# Patient Record
Sex: MALE | Race: WHITE
[De-identification: names, ages, dates, MRNs, and addresses within clinical notes are randomized per-mention and may not be internally consistent; named-entity substitution may affect disease eponyms.]

---

## 2023-01-05 ENCOUNTER — HOSPITAL ENCOUNTER (OUTPATIENT)
Dept: HOSPITAL 97 - ER | Age: 73
Setting detail: OBSERVATION
LOS: 2 days | Discharge: HOME | End: 2023-01-07
Attending: INTERNAL MEDICINE | Admitting: INTERNAL MEDICINE
Payer: COMMERCIAL

## 2023-01-05 VITALS — BODY MASS INDEX: 30.9 KG/M2

## 2023-01-05 DIAGNOSIS — Z20.822: ICD-10-CM

## 2023-01-05 DIAGNOSIS — H81.10: ICD-10-CM

## 2023-01-05 DIAGNOSIS — R07.9: Primary | ICD-10-CM

## 2023-01-05 DIAGNOSIS — Z88.0: ICD-10-CM

## 2023-01-05 DIAGNOSIS — Z23: ICD-10-CM

## 2023-01-05 DIAGNOSIS — E78.5: ICD-10-CM

## 2023-01-05 DIAGNOSIS — I10: ICD-10-CM

## 2023-01-05 DIAGNOSIS — I65.21: ICD-10-CM

## 2023-01-05 DIAGNOSIS — R42: ICD-10-CM

## 2023-01-05 LAB
ALBUMIN SERPL BCP-MCNC: 3.6 G/DL (ref 3.4–5)
ALP SERPL-CCNC: 106 U/L (ref 45–117)
ALT SERPL W P-5'-P-CCNC: 34 U/L (ref 16–61)
AST SERPL W P-5'-P-CCNC: 27 U/L (ref 15–37)
BUN BLD-MCNC: 15 MG/DL (ref 7–18)
GLUCOSE SERPLBLD-MCNC: 114 MG/DL (ref 74–106)
HCT VFR BLD CALC: 44 % (ref 39.6–49)
INR BLD: 1.1
LYMPHOCYTES # SPEC AUTO: 0.9 K/UL (ref 0.7–4.9)
MAGNESIUM SERPL-MCNC: 1.9 MG/DL (ref 1.6–2.4)
MCV RBC: 90.5 FL (ref 80–100)
NT-PROBNP SERPL-MCNC: 99 PG/ML (ref ?–125)
PMV BLD: 7.5 FL (ref 7.6–11.3)
POTASSIUM SERPL-SCNC: 3.9 MMOL/L (ref 3.5–5.1)
RBC # BLD: 4.86 M/UL (ref 4.33–5.43)
TROPONIN I SERPL HS-MCNC: 6.1 PG/ML (ref ?–58.9)
TSH SERPL DL<=0.05 MIU/L-ACNC: 0.63 UIU/ML (ref 0.36–3.74)

## 2023-01-05 PROCEDURE — 93005 ELECTROCARDIOGRAM TRACING: CPT

## 2023-01-05 PROCEDURE — 85610 PROTHROMBIN TIME: CPT

## 2023-01-05 PROCEDURE — 93306 TTE W/DOPPLER COMPLETE: CPT

## 2023-01-05 PROCEDURE — 70544 MR ANGIOGRAPHY HEAD W/O DYE: CPT

## 2023-01-05 PROCEDURE — 87088 URINE BACTERIA CULTURE: CPT

## 2023-01-05 PROCEDURE — 71045 X-RAY EXAM CHEST 1 VIEW: CPT

## 2023-01-05 PROCEDURE — 87086 URINE CULTURE/COLONY COUNT: CPT

## 2023-01-05 PROCEDURE — 78452 HT MUSCLE IMAGE SPECT MULT: CPT

## 2023-01-05 PROCEDURE — 81015 MICROSCOPIC EXAM OF URINE: CPT

## 2023-01-05 PROCEDURE — 70450 CT HEAD/BRAIN W/O DYE: CPT

## 2023-01-05 PROCEDURE — 84443 ASSAY THYROID STIM HORMONE: CPT

## 2023-01-05 PROCEDURE — 87811 SARS-COV-2 COVID19 W/OPTIC: CPT

## 2023-01-05 PROCEDURE — 85025 COMPLETE CBC W/AUTO DIFF WBC: CPT

## 2023-01-05 PROCEDURE — 93017 CV STRESS TEST TRACING ONLY: CPT

## 2023-01-05 PROCEDURE — 84484 ASSAY OF TROPONIN QUANT: CPT

## 2023-01-05 PROCEDURE — 83880 ASSAY OF NATRIURETIC PEPTIDE: CPT

## 2023-01-05 PROCEDURE — 99285 EMERGENCY DEPT VISIT HI MDM: CPT

## 2023-01-05 PROCEDURE — 93880 EXTRACRANIAL BILAT STUDY: CPT

## 2023-01-05 PROCEDURE — 80048 BASIC METABOLIC PNL TOTAL CA: CPT

## 2023-01-05 PROCEDURE — 81003 URINALYSIS AUTO W/O SCOPE: CPT

## 2023-01-05 PROCEDURE — 70549 MR ANGIOGRAPH NECK W/O&W/DYE: CPT

## 2023-01-05 PROCEDURE — 80076 HEPATIC FUNCTION PANEL: CPT

## 2023-01-05 PROCEDURE — 70553 MRI BRAIN STEM W/O & W/DYE: CPT

## 2023-01-05 PROCEDURE — 36415 COLL VENOUS BLD VENIPUNCTURE: CPT

## 2023-01-05 PROCEDURE — 84100 ASSAY OF PHOSPHORUS: CPT

## 2023-01-05 PROCEDURE — 83735 ASSAY OF MAGNESIUM: CPT

## 2023-01-05 NOTE — RAD REPORT
EXAM DESCRIPTION:  RAD - Chest Single View - 1/5/2023 3:05 pm

 

CLINICAL HISTORY:  syncope

Chest pain.

 

COMPARISON:  No comparisons

 

FINDINGS:  Portable technique limits examination quality.

 

The lungs are grossly clear. The heart is upper limit of normal in size. No displaced fractures.

 

IMPRESSION:  No acute intrathoracic process suspected.

## 2023-01-05 NOTE — RAD REPORT
EXAM DESCRIPTION:  US - CP - 1/5/2023 3:58 pm

 

CLINICAL HISTORY:  DIZZINESS

Headache, drowsiness

 

COMPARISON:  No comparisons

 

TECHNIQUE:  Real-time sonographic evaluation of both carotid systems was performed. Doppler interroga
tion was performed with waveform tracing bilaterally.

 

FINDINGS:  Normal high resistance waveforms are noted in both external carotid arteries. The common c
arotid arteries and internal carotid arteries show normal low resistance waveforms.

 

Moderate hard plaque is seen right carotid bulb. Visually this results in a 50% stenosis based NASCET
 criteria. Peak systolic and end diastolic velocity values and the ICA/CCA ratios are in the non-hemo
dynamically significant range.

 

Right vertebral artery nonvisualized. Forward flow seen left vertebral artery.

 

IMPRESSION:  Moderate hard plaque is seen right carotid bulb.

 

Visually, 50% stenosis based on NASCET criteria suspected at this level. No hemodynamically significa
nt stenosis evident.

## 2023-01-05 NOTE — RAD REPORT
EXAM DESCRIPTION:  CT - Head Brain Wo Cont - 1/5/2023 2:57 pm

 

CLINICAL HISTORY:  Syncope, recurrent

Headache, drowsiness,

 

COMPARISON:  No comparisons

 

TECHNIQUE:  All CT scans are performed using dose optimization technique as appropriate and may inclu
de automated exposure control or mA/KV adjustment according to patient size.

 

FINDINGS:  No intracranial hemorrhage, hydrocephalus or extra-axial fluid collection.Mild brain atrop
hy.No areas of brain edema or evidence of midline shift.

 

The paranasal sinuses and mastoids are clear. The calvarium is intact.

 

IMPRESSION:  No acute intracranial abnormality.

## 2023-01-05 NOTE — ER
Nurse's Notes                                                                                     

 [facility]                                                                 

Name: Jessu Muller                                                                             

Age: 72 yrs                                                                                       

Sex: Male                                                                                         

: 1950                                                                                   

MRN: [id number]                                                                                   

Arrival Date: 2023                                                                          

Time: 14:26                                                                                       

Account#: [id number]                                                                            

Bed 20                                                                                            

Private MD:                                                                                       

Diagnosis: Syncope Near                                                                           

                                                                                                  

Presentation:                                                                                     

                                                                                             

14:35 Initial Sepsis Screen: Does the patient meet any 2 criteria? No. Patient's initial      db  

      sepsis screen is negative. Does the patient have a suspected source of infection? No.       

      Patient's initial sepsis screen is negative. Risk Assessment: Do you want to hurt           

      yourself or someone else? Patient reports no desire to harm self or others. Onset of        

      symptoms was 2023.                                                              

14:35 Acuity: LG 2                                                                           db  

14:40 Chief complaint: EMS states: Patient became dizzy and diaphoretic and pale while trying db  

      to put something in the back of his truck. States has had these similar symptoms but        

      was able to calm down. states felt like was going to pass out had chest "burning" and       

      palpitations. Denies pain. Calm upon arrival and no longer diaphoretic. Coronavirus         

      screen: Vaccine status: Patient reports receiving the 2nd dose of the covid vaccine.        

      Client denies travel out of the [place] in the last 14 days. At this time, the client does     

      not indicate any symptoms associated with coronavirus-19. Ebola Screen: Patient             

      negative for fever greater than or equal to 101.5 degrees Fahrenheit, and additional        

      compatible Ebola Virus Disease symptoms Patient denies exposure to infectious person.       

      Patient denies travel to an Ebola-affected area in the 21 days before illness onset. No     

      symptoms or risks identified at this time.                                                  

14:40 Method Of Arrival: EMS: [facility]                                                db  

                                                                                                  

Triage Assessment:                                                                                

15:02 General: Appears in no apparent distress. comfortable, Behavior is calm, cooperative,   db  

      appropriate for age. Pain: Denies pain. Complains of pain in chest.                         

                                                                                                  

Historical:                                                                                       

- Allergies:                                                                                      

15:02 PENICILLINS;                                                                            db  

- PMHx:                                                                                           

15:02 Hypertensive disorder;                                                                  db  

                                                                                                  

- Immunization history:: Adult Immunizations up to date, Client reports receiving the             

  2nd dose of the Covid vaccine.                                                                  

- Social history:: Smoking status: unknown.                                                       

                                                                                                  

                                                                                                  

Screenin:00 [facility] ED Fall Risk Assessment (Adult) History of falling in the last 3 months,       db  

      including since admission No falls in past 3 months (0 pts). Abuse screen: Denies           

      threats or abuse. Denies injuries from another. Nutritional screening: No deficits          

      noted. Tuberculosis screening: No symptoms or risk factors identified.                      

                                                                                                  

Assessment:                                                                                       

15:00 Reassessment: Patient appears in no apparent distress at this time. Patient is alert,   db  

      oriented x 3, equal unlabored respirations, skin warm/dry/pink. General: Appears in no      

      apparent distress. comfortable, Behavior is calm, cooperative, appropriate for age.         

      Neuro: Reports dizziness.                                                                   

15:00 Cardiovascular: Reports diaphoresis, lightheadedness, Chest pain "BURNING" SENSATION    db  

      ACROSS CHEST.                                                                               

16:30 Reassessment: Patient appears in no apparent distress at this time. Patient and/or      db  

      family updated on plan of care and expected duration. Pain level reassessed. Patient is     

      alert, oriented x 3, equal unlabored respirations, skin warm/dry/pink. PATIENT              

      AMBULATORY TO THE RESTROOM. STEADY GATE. NAD. DENIES DIZZINESS DENIES NAUSEA Patient        

      states feeling better. Patient states symptoms have improved. General: Appears in no        

      apparent distress. comfortable, Behavior is calm, cooperative, appropriate for age.         

      Pain: Denies pain. Neuro: No deficits noted. Level of Consciousness is awake, alert,        

      obeys commands, Oriented to person, place, time, situation. Cardiovascular: No deficits     

      noted. Respiratory: No deficits noted. GI: No deficits noted. No signs and/or symptoms      

      were reported involving the gastrointestinal system. : No deficits noted. No signs        

      and/or symptoms were reported regarding the genitourinary system. EENT: No deficits         

      noted. No signs and/or symptoms were reported regarding the EENT system. Derm: No           

      deficits noted. No signs and/or symptoms reported regarding the dermatologic system.        

      Musculoskeletal: No deficits noted. No signs and/or symptoms reported regarding the         

      musculoskeletal system.                                                                     

17:38 Reassessment: Patient appears in no apparent distress at this time. No changes from     db  

      previously documented assessment. Patient and/or family updated on plan of care and         

      expected duration. Pain level reassessed. Patient is alert, oriented x 3, equal             

      unlabored respirations, skin warm/dry/pink. Patient states symptoms have improved.          

18:24 Reassessment: Patient appears in no apparent distress at this time. No changes from     db  

      previously documented assessment. Patient and/or family updated on plan of care and         

      expected duration. Pain level reassessed. Patient is alert, oriented x 3, equal             

      unlabored respirations, skin warm/dry/pink.                                                 

19:20 Reassessment: ASSUMED CARE OF PT. PT SITTING UP IN BED. NO PAIN OR DISTRESS NOTED .VS   jj7 

      STABLE. CALL BELL IN REACH. NO NEEDS AT THIS TIME.                                          

22:00 Reassessment: PT SWITCHED IN TO A HOSPITAL BED FOR COMFORT. VS STABLE. LIGHTS TURNED    jj7 

      OFF .WARM BLANKETS PROVIDED. CALL BELL IN REACH. NO NEEDS AT THIS TIME.                     

                                                                                             

00:41 Reassessment: PT SLEEPING COMFORTABLY IN BED. VS STABLE. CALL BELL IN REACH. SIDE RAILS jj7 

      UP X2.                                                                                      

                                                                                                  

Vital Signs:                                                                                      

                                                                                             

14:35  / 84; Pulse 76; Resp 16; Temp 98.6; Pulse Ox 100% ; Weight 96.16 kg; Height 5    db  

      ft. 10 in. (177.80 cm); Pain 0/10;                                                          

15:00  / 74; Pulse 76; Resp 18; Pulse Ox 100% on R/A;                                   db  

17:00  / 85; Pulse 69; Resp 18; Pulse Ox 97% on R/A;                                    db  

18:20  / 86 Supine; Pulse 70;                                                           db  

18:22  / 86; Pulse 73;                                                                  db  

18:24  / 89 Standing; Pulse 76;                                                         db  

19:20  / 83; Pulse 69; Resp 19; Pulse Ox 99% ; Pain 0/10;                               jj7 

20:30  / 92; Pulse 68; Resp 16; Pulse Ox 99% ; Pain 0/10;                               jj7 

21:32  / 74; Pulse 75; Resp 18; Pulse Ox 99% ; Pain 0/10;                               jj7 

22:29  / 75; Pulse 71; Resp 20; Pulse Ox 97% ;                                          jj7 

23:30  / 68; Pulse 68; Resp 16; Pulse Ox 100% ; Pain 0/10;                              jj7 

                                                                                             

00:43  / 74; Pulse 68; Resp 17; Pulse Ox 99% ;                                          jj7 

                                                                                             

14:35 Body Mass Index 30.42 (96.16 kg, 177.80 cm)                                             db  

                                                                                                  

Vitals:                                                                                           

                                                                                             

18:24 Cardiac Rhythm Assessment Regular Sinus rhythm.                                         db  

                                                                                                  

NIH Stroke Scale Scores:                                                                          

14:53 NIHSS Score: 0                                                                          snw 

                                                                                                  

ED Course:                                                                                        

14:26 Patient arrived in ED.                                                                  snw 

14:26 Greg Menchaca DO is Attending Physician.                                                snw 

14:26 Joann Garcia FNP-C is PHCP.                                                          snw 

14:35 Arm band placed on right wrist. Patient placed in an exam room.                         db  

14:57 Yamilet Camp, RN is Primary Nurse.                                                  db  

14:58 CT Head Brain wo Cont In Process Unspecified.                                           EDMS

15:02 Triage completed.                                                                       db  

15:06 XRAY Chest (1 view) In Process Unspecified.                                             EDMS

15:13 Inserted saline lock: 20 gauge in left antecubital area, using aseptic technique. Blood ll1 

      collected.                                                                                  

15:32 Basic Metabolic Panel Sent.                                                             bc6 

15:32 CBC with Diff Sent.                                                                     bc6 

15:32 LFT's Sent.                                                                             bc6 

15:32 Magnesium Sent.                                                                         bc6 

15:32 NT PRO-BNP Sent.                                                                        bc6 

15:32 PT-INR Sent.                                                                            bc6 

15:32 Troponin HS Sent.                                                                       bc6 

15:32 Initial lab(s) drawn, by me, sent to lab. Inserted saline lock: 20 gauge in right       bc6 

      antecubital area, using aseptic technique.                                                  

15:59 US Carotid Artery Bilateral In Process Unspecified.                                     EDMS

17:00 Patient has correct armband on for positive identification. Bed in low position. Call   db  

      light in reach. Side rails up X 1.                                                          

17:00 Client placed on continuous cardiac and pulse oximetry monitoring. NIBP monitoring      db  

      applied. Warm blanket given.                                                                

17:00 No provider procedures requiring assistance completed.                                  db  

17:19 Urine Culture Sent.                                                                     bc6 

17:19 Urine Microscopic Only Sent.                                                            bc6 

17:20 Urine collected: clean catch specimen.                                                  bc6 

17:47 Elie Crouch MD is Hospitalizing Provider.                                             snw 

19:41 SARS RAPID Sent.                                                                        as7 

01/                                                                                             

00:49 Patient admitted, IV remains in place.                                                  jj7 

                                                                                                  

Administered Medications:                                                                         

                                                                                             

16:22 CANCELLED (not available): Phenergan (promethazine) 12.5 mg IM once                     snw 

16:26 Drug: Aspirin Chewable Tablet 324 mg Route: PO;                                         db  

17:58 Drug: Potassium \T\ Sodium Phosphates Packet 280 mg-160 mg-250 mg 1 packets Route: PO;    db

18:26 Follow up: Response: No adverse reaction                                                db  

                                                                                                  

                                                                                                  

Medication:                                                                                       

17:00 VIS not applicable for this client.                                                     db  

                                                                                                  

Outcome:                                                                                          

17:47 Decision to Hospitalize by Provider.                                                    snw 

                                                                                             

00:48 Admitted to Med/surg accompanied by nurse, via wheelchair, room 413, Report called to   julius RAMOS RN                                                                                    

      Condition: stable                                                                           

01:22 Patient left the ED.                                                                    julius 

                                                                                                  

                                                                                                  

NIH Stroke Scale - NIH Stroke Score                                                               

Date: 2023                                                                                  

Time: 14:53                                                                                       

Total Score = 0                                                                                   

  1a. Level of Consciousness (LOC) - 0(Alert)                                                     

  1b. Level of Consciousness (LOC) (Month \T\ Age) - 0(Both)                                      

  1c. LOC Commands (Open \T\ Closes Eyes/) - 0(Both)                                          

   2. Best Gaze (Lateral Gaze Paresis) - 0(Normal)                                                

   3. Visual Field Loss - 0(No visual loss)                                                       

   4. Facial Palsy - 0(Normal)                                                                    

  5a. Left Arm: Motor (10-second hold) - 0(No drift)                                              

  5b. Right Arm: Motor (10-second hold) - 0(No drift)                                             

  6a. Left Leg: Motor (5-second hold - always test supine) - 0(No drift)                          

  6b. Right Leg: Motor (5-second hold - always test supine) - 0(No drift)                         

   7. Limb Ataxia (finger/nose \T\ heel/shin - test with eyes open) - 0(Absent)                   

   8. Sensory Loss (pinprick arms/legs/face) - 0(Normal)                                          

   9. Best Language: Aphasia (description/naming/reading) - 0(No aphasia)                         

  10. Dysarthria (speech clarity - read or repeat words) - 0(Normal)                              

  11. Extinction and Inattention (visual/tactile/auditory/spatial/personal) - 0(No                

      abnormality)                                                                                

Initials: snw                                                                                     

                                                                                                  

Signatures:                                                                                       

Dispatcher [employer]                           EDMS                                                 

Joann Garcia, FNP-C                   FNP-Csnw                                                  

Sandip Anderson RN                       RN   llAmrik Rosenthal RN RN jj7 Benton, Danielle, RN RN db Senkyrik, Autumn as7 Carowatson, Breana bc6                                                  

                                                                                                  

**************************************************************************************************

## 2023-01-05 NOTE — XMS REPORT
Continuity of Care Document

                           Created on:2023



Patient:GAYLE ARTEAGA

Sex:Male

:1950

External Reference #:[id number]





Demographics







                          Address                   [address] [place] [address]

 

                          Home Phone                [phone]

 

                          Mobile Phone              [phone]

 

                          Email Address             [email or web address]

 

                          Preferred Language        en

 

                          Marital Status            Unknown

 

                          Mu-ism Affiliation     Unknown

 

                          Race                      Unknown

 

                          Additional Race(s)        White



                                                    Unavailable

 

                          Ethnic Group              Not  or 









Author







                          Organization              [facility]

t

 

                          Address                   [address] [place] [address] [address]



                                                    [place] [address]

 

                          Phone                     [phone]









Support







                Name            Relationship    Address         Phone

 

                JOSUÉ ARTEAGA               Unavailable     Unavailable

 

                CELINA TORRES  Daughter        Unavailable     [phone]









Care Team Providers







                    Name                Role                Phone

 

                    Miko Elie AGUILAR      Primary Care Physician [phone]

 

                    LEXX HADLEY Attending Clinician Unavailable

 

                    TRISH HOBBS     Attending Clinician Unavailable

 

                    ANGELICA WALLACE Attending Clinician Unavailable

 

                    MERA ANTONY       Attending Clinician Unavailable

 

                    Stefan Watters MD  Attending Clinician [phone]

 

                    Diego Dillard MD     Attending Clinician [phone]

 

                    Sanju Guerra Attending Clinician [phone]

 

                    Diego Dillard Attending Clinician [phone]

 

                    MEHREEN HAMPTON   Attending Clinician Unavailable

 

                    CELINA SUGGS        Attending Clinician Unavailable

 

                    Shiv_HANNAH          Attending Clinician Unavailable

 

                    Willam Chaney Attending Clinician [phone]

 

                    Fe Katz Attending Clinician [phone]

 

                    Provider, [facility] Attending Clinician Unavailable

 

                    FE FUNES    Attending Clinician Unavailable

 

                    Doctor Unassigned, No Name Attending Clinician Unavailable

 

                    DIEGO DILLARD M.D.  Attending Clinician Unavailable

 

                    NAJMA SHANNON M.D. Attending Clinician Unavailable

 

                    KERI NAM M.D. Attending Clinician Unavailable

 

                    Shiv_HANNAH          Admitting Clinician Unavailable









Payers







           Payer Name Policy Type Policy Number Effective Date Expiration Date LITO garcia

 

           [facility] MEDICARE            [id number] 2020            



           SUPPLEMENT                       00:00:00              

 

           MEDICARE PART B            [id number] 2019 



                                            00:00:00   00:00:00   

 

           MEDICARE PART A            [id number] 2015            



           \T\ B                            00:00:00              

 

           [facility]            [id number] 2022            



           MEDICARE                         00:00:00              



           SUPPLEMENT                                             

 

           [employer]                [id number] 2021            



           HEALTHCARE/[employer]ARP                       00:00:00              

 

           [place]:            [id number] 2015            



           [employer]                       00:00:00              

 

           [employer]            [id number] 2021            



           [employer] (MEDICARE                       00:00:00              



           SUPPLEMENT)                                             







Problems







       Condition Condition Condition Status Onset  Resolution Last   Treating Co

mments 

Source



       Name   Details Category        Date   Date   Treatment Clinician        



                                                 Date                 

 

       Rhegmatoge Rhegmatoge Disease Active 2022                      Overview

: [facility]



       nous   nous                 2-07                        [facility]



       retinal retinal               00:00:                      g of this 



       detachment detachment               00                          note   



       of right of right                                           might be 



       eye    eye                                              different 



                                                               from the 



                                                               original. 



                                                               Hx of  



                                                               mac-off 



                                                               RRD    



                                                               repaired 



                                                               2022 by 



                                                               Dr. Wallace 



                                                               Last   



                                                               Assessmen 



                                                               t & Plan: 



                                                               [facility] 



                                                               g of this 



                                                               note   



                                                               might be 



                                                               different 



                                                               from the 



                                                               original. 



                                                               Stable 



                                                               today on 



                                                               optos/DFE 



                                                               Upcoming 



                                                               appt with 



                                                               Dr. Wallace 

 

       Adrenal Adrenal Disease Active                              [facility]



       adenoma, adenoma,               8-30                               Health



       right  right                00:00:                             



                                   00                                 

 

       Grief  Grief  Disease Active                              [facility]



       reaction reaction               5-05                               Health



                                   00:00:                             



                                   00                                 

 

       Wheezing Wheezing Disease Active                              [facility]



                                   3-18                               Health



                                   00:00:                             



                                   00                                 

 

       Cough  Cough  Disease Active                              [facility]



                                   3-18                               [facility]



                                   00:00:                             



                                   00                                 

 

       Anesthesia Anesthesia Disease Active                              [facility]

T



       of skin of skin               3-18                               Health



                                   00:00:                             



                                   00                                 

 

       Abnormal Abnormal Disease Active                              [facility]



       chest  chest                3-18                               Health



       x-ray  x-ray                00:00:                             



                                   00                                 

 

       Ptosis of Ptosis of Disease Active 2021                      Last   [facility]



       both   both                 2-06                        [facility]



       eyelids eyelids               00:00:                      t & Plan: 



                                   00                          Formattin 



                                                               g of this 



                                                               note   



                                                               might be 



                                                               different 



                                                               from the 



                                                               original. 



                                                               Refer to 



                                                               leonie 



                                                               ptosis / 



                                                               bleph  

 

       Floppy Floppy Disease Active 2021                      Last   [facility]



       eyelid eyelid               2-06                        [facility]



       syndrome syndrome               00:00:                      t & Plan: 



                                   00                          Formattin 



                                                               g of this 



                                                               note   



                                                               might be 



                                                               different 



                                                               from the 



                                                               original. 



                                                               Has had 



                                                               sleep  



                                                               apnea  



                                                               diagnosed 



                                                               but is 



                                                               not being 



                                                               treated, 



                                                               d/w pt 



                                                               importanc 



                                                               e of   



                                                               treating 



                                                               sleep  



                                                               apnea for 



                                                               cardiovas 



                                                               cular  



                                                               health he 



                                                               will d/w 



                                                               his    



                                                               PCPContin 



                                                               ue tears, 



                                                               add    



                                                               overnight 



                                                               ointment; 



                                                               if that 



                                                               isn't  



                                                               enough 



                                                               can trial 



                                                               eye    



                                                               decker/m 



                                                               oisture 



                                                               chamber 

 

       Pseudophak Pseudophak Disease Active 2021                      Last   [facility]

T



       ia, both ia, both               2-06                        [facility]

lth



       eyes   eyes                 00:00:                      t & Plan: 



                                   00                          Formattin 



                                                               g of this 



                                                               note   



                                                               might be 



                                                               different 



                                                               from the 



                                                               original. 



                                                               MRx given 

 

       Vitreous Vitreous Disease Active 2021                      Last   [facility]



       syneresis syneresis               2                        [facility]

ealth



       of both of both               00:00:                      t & Plan: 



       eyes   eyes                 00                          Formattin 



                                                               g of this 



                                                               note   



                                                               might be 



                                                               different 



                                                               from the 



                                                               original. 



                                                               D/w pt 



                                                               etiology 



                                                               of     



                                                               floaters 

 

       Nightmare Nightmare Disease Active                              [facility]



       disorder disorder                                              Health



                                   00:00:                             



                                   00                                 

 

       Low back Low back Disease Active                              [facility]



       pain   pain                 612                               Health



                                   00:00:                             



                                   00                                 

 

       Left foot Left foot Disease Active                              [facility]



       drop   drop                 612                               Health



                                   00:00:                             



                                   00                                 

 

       Spinal Spinal Disease Active                              [facility]



       stenosis stenosis               612                               Health



       of lumbar of lumbar               00:00:                             



       region region               00                                 



       without without                                                  



       neurogenic neurogenic                                                  



       claudicati claudicati                                                  



       on     on                                                      

 

       Scoliosis Scoliosis Disease Active                              [facility]



                                   6-12                               Health



                                   00:00:                             



                                   00                                 

 

       Lumbar Lumbar Disease Active 2017                             [facility]



       radiculopa radiculopa               206                               He

alth



       thy    thy                  00:00:                             



                                   00                                 

 

       Numbness Numbness Disease Active 2017                             [facility]



       and    and                  2-06                               Health



       tingling tingling               00:00:                             



                                   00                                 

 

       Spinal  Spinal Problem Active 2022               Abdelrahman

manuela



       stenosis stenosis                         03:30:39               l



       in     in                   00:00:                             Shmuel



       cervical cervical               00                                 



       region region                                                  



       (disorder) (disorder)                                                  



              Active                                                  



              2016                                                  



              Problem                                                  



              2022                                                  



               [facility]                                                  



              [facility]                                                  



              OPID                                                    



              [facility]                                                  

 

       Apnea  Apnea  Disease Active                              [facility]



                                                                  Health



                                   00:00:                             



                                   00                                 

 

       No known No known Disease                                           Unive

rs



       active active                                                  ity of



       problems problems                                                  [facility]

 

       History of History of Problem Resolve                                    

[facility]



       hyperlipid hyperlipid        d                                         Ph

ysici



       emia   emia                                                    ans

 

       History of History of Problem Resolve                                    

[facility]



       hypertensi hypertensi        d                                         Ph

ysici



       on     on                                                      ans

 

       Leg    Leg    Problem Active                                    [facility]



       numbness numbness                                                  Physic

i



                                                                      ans

 

       Degenerati Degenerati Problem Active                                    U

T



       ve     ve                                                      Physici



       scoliosis scoliosis                                                  ans

 

       Dream  Dream  Problem Active                                    [facility]



       enactment enactment                                                  Phys

ici



       behavior behavior                                                  ans

 

       Severe Severe Problem Active                                    [facility]



       obstructiv obstructiv                                                  Ph

ysici



       e sleep e sleep                                                  ans



       apnea  apnea                                                   

 

       Anxiety  Anxiety Problem Resolve               2022               M

emoria



       (finding) (finding)        d                    03:30:39               l



              Resolved                                                  Bertrand



              Problem                                                  



              2022                                                  



              [facility]                                                  

 

       Hypertensi  Hypertens Problem Active               2022            

   [facility]



       ve     marjorie                                03:30:39               l



       disorder, disorder,                                                  Herm

kvng



       systemic systemic                                                  



       arterial arterial                                                  



       (disorder) (disorder)                                                  



              Active                                                  



              Problem                                                  



              [facility]                                                  



              [facility]                                                    



              [facility]                                                  

 

       Hyperlipid  Hyperlipi Problem Active               2022            

   [facility]



       emia   demia                              03:30:39               l



       (disorder) (disorder)                                                  He

rmann



               Active                                                  



              Problem                                                  



              2022                                                  



              [facility]                                                    



              [facility]                                                  

 

       Gastroesop  Gastroeso Problem Active               2022            

   [facility]



       hageal phageal                             03:30:39               l



       reflux reflux                                                  Shmuel



       disease disease                                                  



       (disorder) (disorder)                                                  



              Active                                                  



              Problem                                                  



              2022                                                  



              [facility]                                                  



              [facility]                                                    



              [facility]                                                  

 

       Carpal  Carpal Problem Active               2022               Abdelrahman

manuela



       tunnel tunnel                             03:30:39               l



       syndrome syndrome                                                  [place]

n



       (disorder) (disorder)                                                  



              Active                                                  



              Problem                                                  



              2022                                                  



              [facility]                                                  



              [facility]                                                    



              Blessing                                                  

 

       Smoker  Smoker Problem Active               2022               Abdelrahman

manuela



       (finding) (finding)                             03:30:39               l



              Active                                                  Shmuel



              Problem                                                  



              2022                                                  



              [facility]                                                  

 

       Numbness   Numbness Problem Active               2022              

 Memoria



       of hand of hand                             03:30:39               l



       (finding) (finding)                                                  Herm

kvng



              Active                                                  



              Problem                                                  



              2022                                                  



              Bilateral                                                  



              [facility]                                                  



              MERLY                                                    



              Blessing                                                  

 

       Finding   Finding Problem Active               2022               M

emoria



       relating relating                             03:30:39               l



       to moist to moist                                                  [place]

n



       tobacco tobacco                                                  



       use    use                                                     



       (finding) (finding)                                                  



              Active                                                  



              Problem                                                  



              20[facility]                                                  



               [facility]                                                    



              [facility]                                                  

 

       Spinal  Spinal Problem Active               2022               Abdelrahman

manuela



       stenosis stenosis                             03:30:39               l



       of lumbar of lumbar                                                  Herm

kvng



       region region                                                  



       (disorder) (disorder)                                                  



              Active                                                  



              Problem                                                  



              2022                                                  



               [facility]                                                  



              Neuro,                                                  



              OPID                                                    



              Blessing                                                  

 

       Foot-drop  Foot-drop Problem Active               2022             

  Memoria



       (finding) (finding)                             03:30:39               l



              Active                                                  Bertrand



              Problem                                                  



              2022                                                  



              [facility]                                                  



              Neuro                                                   

 

       Retinal  Retinal Problem Active               2022               Me

moria



       detachment detachment                             03:30:39               

l



       (disorder) (disorder)                                                  He

rmann



              Active                                                  



              Problem                                                  



              2022                                                  



              [facility]                                                  



              Neuro                                                   

 

       R91.1 -  R91.1 - Diagnosis Active               2022-05-10               

Memoria



       SOLITARY SOLITARY                             14:36:00               l



       PULMONARY PULMONARY                                                  Herm

kvng



       NODULE NODULE                                                  



       R05.9  R05.9                                                   



              Active                                                    



              OPID                                                    



              Blessing                                                  







Allergies, Adverse Reactions, Alerts







       Allergy Allergy Status Severity Reaction(s) Onset  Inactive Treating Comm

ents 

Source



       Name   Type                        Date   Date   Clinician        

 

       Penicill Propensi Active        Unknown 2021                      [facility]



       ins    ty to                       2-06                        Health



              adverse                      00:00:                      



              reaction                      00                          



              s                                                       

 

       Penicill Propensi Active        Rash                         [facility]

s



       ins    ty to                       6-16                        ity of



              adverse                      00:00:                      [place]



              reaction                      00                          Medical



              s                                                       Branch

 

       PENICILL Drug   Active        Rash                         [facility]



       INS    Class                       6-16                        ity of



                                          00:00:                      [place]



                                          00                          Medical



                                                                      Branch

 

       [facility]ill Allergy Active        Other                              [facility]



       ins    to drug                                                  Physici



              (finding                                                  ans



              )                                                       

 

       PENICILL Allergy Active                                           [place]



       INS    to                                                      [place]



              substanc                                                  [facility]



              e                                                       

 

       penicill penicill Active Mild                                      Memori

a



       ins    ins                                                     l



                                                                      Shmuel

 

       NO KNOWN Drug   Active                                           [facility]



       ALLERGIE Class                                                   ity of



       S                                                              [place]



                                                                      Medical



                                                                      Branch







Family History







           Family Member Diagnosis  Comments   Start Date Stop Date  Source

 

           Mother     Family history of                                 

  [facility]

 

           Mother     Family history of                                  [facility]

[facility]



                      cerebrovascular accident                                  



                      (CVA)                                       

 

           Father     Family history of malignant                               

   [facility]



                      neoplasm                                    

 

           Father     Family history of                                 

  [facility]







Social History







           Social Habit Start Date Stop Date  Quantity   Comments   Source

 

           History of tobacco                       Snuff User            [facility]

[facility]



           use                                                    

 

           Exposure to 2022 Not sure              [facility]



           SARS-CoV-2 (event) 00:00:00   09:12:00                         

 

           Alcohol intake 2022 2.86 /d               [facility]



                      00:00:00   00:00:00                         

 

           Tobacco use and 2022 [facility] smokeless            [facility]



           exposure   00:00:00   00:00:00   tobacco               

 

           Cigarette  2022                       [facility]



           pack-years 00:00:00   00:00:00                         

 

           Social History 2015                       [facility] LEEANN dias



                      20:41:29   20:41:29                         

 

           Sex Assigned At 1950 M                     [facility]



           Birth      00:00:00   00:00:00                         









                Smoking Status  Start Date      Stop Date       Source

 

                Tobacco smoking consumption                                 [facility]

ealth



                unknown                                         

 

                Ex-smoker (finding)                                 [facility]

s

 

                Never Smoker                                    [facility]

ology

 

                Occasional tobacco smoker 2022 00:00:00                 [facility]

 

                Smokes tobacco daily 2022 00:00:00                 [facility]

[facility]







Medications







       Ordered Filled Start  Stop   Current Ordering Indication Dosage Frequency

 Signature

                    Comments            Components          Source



     Medication Medication Date Date Medication? Clinician                (SIG) 

          



     Name Name                                                   

 

     tamsulosin      2022      Yes            .4mg QD   Take 0.4           [facility]



     (Flomax)      2-22                               mg by           Health



     0.4 MG 24      09:22:                               mouth 1           



     hr capsule      19                                 (one) time           



                                                  each day.           

 

     citalopram      2022      Yes                 QD   Take by           [facility]



     (CeleXA) 20      2-22                               mouth 1           Healt

h



     MG tablet      09:22:                               (one) time           



               19                                 each day.           

 

     methocarbam      2022      Yes            500mg Q.25D Take 500           

[facility]



     ol        2-22                               mg by           Health



     (Robaxin)      09:22:                               mouth 4           



     500 MG      19                                 (four)           



     tablet                                         times a           



                                                  day.           

 

     tamsulosin      2022      Yes            .4mg QD   Take 0.4           [facility]



     (Flomax)      2-07                               mg by           Health



     0.4 MG 24      10:11:                               mouth 1           



     hr capsule      45                                 (one) time           



                                                  each day.           

 

     citalopram      2022      Yes                 QD   Take by           [facility]



     (CeleXA) 20      2-07                               mouth 1           Healt

h



     MG tablet      10:11:                               (one) time           



               45                                 each day.           

 

     methocarbam      2022      Yes            500mg Q.25D Take 500           

[facility]



     ol        2-07                               mg by           Health



     (Robaxin)      10:11:                               mouth 4           



     500 MG      45                                 (four)           



     tablet                                         times a           



                                                  day.           

 

     tamsulosin            Yes            .4mg QD   Take 0.4           [facility]



     (Flomax)      8-30                               mg by           Health



     0.4 MG 24      08:50:                               mouth 1           



     hr capsule      44                                 (one) time           



                                                  each day.           

 

     citalopram      -0      Yes                 QD   Take by           [facility]



     (CeleXA) 20      8-30                               mouth 1           Healt

h



     MG tablet      08:50:                               (one) time           



               44                                 each day.           

 

     methocarbam      2022-0      Yes            500mg Q.25D Take 500           

[facility]



     ol        8-30                               mg by           Health



     (Robaxin)      08:50:                               mouth 4           



     500 MG      44                                 (four)           



     tablet                                         times a           



                                                  day.           

 

     omeprazole      2022-0 2022- No             10mg QD   Take 10 mg           

[facility]



     (PriLOSEC)      5-05 05-05                          by mouth 1           He

alth



     10 MG DR      17:17: 00:00                          (one) time           



     capsule      24   :00                           each day.           



                                                  Do not           



                                                  crush or           



                                                  chew.           

 

     lovastatin      2022-0 2022- No             10mg      Take 10 mg           

[facility]



     (Mevacor)      5-05 05-05                          by mouth           Healt

h



     10 MG      17:17: 00:00                          every           



     tablet      21   :00                           night.           

 

     lisinopril      2022-0 2022- No             10mg QD   Take 10 mg           

[facility]



     10 MG      5-05 05-05                          by mouth 1           Health



     tablet      17:17: 00:00                          (one) time           



               18   :00                           each day.           

 

     gabapentin      2022-0 2022- No                  [id number] Take by        

   [facility]



     (Neurontin)      5-05 05-05                     [id number] mouth 3         

  Health



     250 MG/5ML      17:17: 00:00                     3D   (three)           



     solution      18   :00                           times a           



                                                  day.           

 

     tamsulosin      2022-0      Yes            .4mg QD   Take 0.4           [facility]



     (Flomax)      5-05                               mg by           Health



     0.4 MG 24      13:49:                               mouth 1           



     hr capsule      43                                 (one) time           



                                                  each day.           

 

     citalopram      2022-0      Yes            10mg QD   Take 10 mg           U

T



     (CeleXA) 10      5-05                               by mouth 1           He

alth



     MG/5ML      13:49:                               (one) time           



     solution      43                                 each day.           

 

     methocarbam      2022-0      Yes            500mg Q.25D Take 500           

[facility]



     ol        5-05                               mg by           Health



     (Robaxin)      13:49:                               mouth 4           



     500 MG      43                                 (four)           



     tablet                                         times a           



                                                  day.           

 

     tamsulosin      2022-0      Yes            .4mg QD   Take 0.4           [facility]



     (Flomax)      5-05                               mg by           Health



     0.4 MG 24      13:49:                               mouth 1           



     hr capsule      43                                 (one) time           



                                                  each day.           

 

     citalopram      2022-0      Yes            10mg QD   Take 10 mg           U

T



     (CeleXA) 10      5-05                               by mouth 1           He

alth



     MG/5ML      13:49:                               (one) time           



     solution      43                                 each day.           

 

     methocarbam      2022-0      Yes            500mg Q.25D Take 500           

[facility]



     ol        5-05                               mg by           Health



     (Robaxin)      13:49:                               mouth 4           



     500 MG      43                                 (four)           



     tablet                                         times a           



                                                  day.           

 

     tamsulosin      2022-0      Yes            .4mg QD   Take 0.4           [facility]



     (Flomax)      5-05                               mg by           Health



     0.4 MG 24      13:49:                               mouth 1           



     hr capsule      43                                 (one) time           



                                                  each day.           

 

     citalopram      2022-0      Yes            10mg QD   Take 10 mg           U

T



     (CeleXA) 10      5-05                               by mouth 1           He

alth



     MG/5ML      13:49:                               (one) time           



     solution      43                                 each day.           

 

     methocarbam      2022-0      Yes            500mg Q.25D Take 500           

[facility]



     ol        5-05                               mg by           Health



     (Robaxin)      13:49:                               mouth 4           



     500 MG      43                                 (four)           



     tablet                                         times a           



                                                  day.           

 

     tamsulosin      2022-0      Yes            .4mg QD   Take 0.4           [facility]



     (Flomax)      5-05                               mg by           Health



     0.4 MG 24      13:49:                               mouth 1           



     hr capsule      43                                 (one) time           



                                                  each day.           

 

     citalopram      2022-0      Yes            10mg QD   Take 10 mg           U

T



     (CeleXA) 10      5-05                               by mouth 1           He

alth



     MG/5ML      13:49:                               (one) time           



     solution      43                                 each day.           

 

     methocarbam      2022-0      Yes            500mg Q.25D Take 500           

[facility]



     ol        5-05                               mg by           Health



     (Robaxin)      13:49:                               mouth 4           



     500 MG      43                                 (four)           



     tablet                                         times a           



                                                  day.           

 

     omeprazole      2022-0      Yes                                     [facility]



     (PriLOSEC)      2-13                                              Health



     20 MG DR      00:00:                                              



     capsule      00                                                

 

     lovastatin      2022-0      Yes                                     [facility]



     (Mevacor)      2-13                                              Health



     20 MG      00:00:                                              



     tablet      00                                                

 

     lisinopril      2022-0      Yes                                     [facility]



     20 MG      2-13                                              Health



     tablet      00:00:                                              



               00                                                

 

     omeprazole      2022-0      Yes                                     [facility]



     (PriLOSEC)      2-13                                              Health



     20 MG DR      00:00:                                              



     capsule      00                                                

 

     lovastatin      2022-0      Yes                                     [facility]



     (Mevacor)      2-13                                              Health



     20 MG      00:00:                                              



     tablet      00                                                

 

     lisinopril      2022-0      Yes                                     [facility]



     20 MG      2-13                                              Health



     tablet      00:00:                                              



               00                                                

 

     omeprazole      2022-0      Yes                                     [facility]



     (PriLOSEC)      2-13                                              Health



     20 MG DR      00:00:                                              



     capsule      00                                                

 

     lovastatin      -0      Yes                                     [facility]



     (Mevacor)      2-13                                              Health



     20 MG      00:00:                                              



     tablet      00                                                

 

     lisinopril      -0      Yes                                     [facility]



     20 MG      2-13                                              Health



     tablet      00:00:                                              



               00                                                

 

     omeprazole      2-0      Yes                                     [facility]



     (PriLOSEC)      2-13                                              Health



     20 MG DR      00:00:                                              



     capsule      00                                                

 

     lovastatin      -0      Yes                                     [facility]



     (Mevacor)      2-13                                              Health



     20 MG      00:00:                                              



     tablet      00                                                

 

     lisinopril      -0      Yes                                     [facility]



     20 MG      2-13                                              Health



     tablet      00:00:                                              



               00                                                

 

     omeprazole      2-0      Yes                                     [facility]



     (PriLOSEC)      2-13                                              Health



     20 MG DR      00:00:                                              



     capsule      00                                                

 

     lovastatin      -0      Yes                                     [facility]



     (Mevacor)      2-13                                              [facility]



     20 MG      00:00:                                              



     tablet      00                                                

 

     lisinopril      2-0      Yes                                     [facility]



     20 MG      2-13                                              [facility]



     tablet      00:00:                                              



               00                                                

 

     omeprazole      2-0      Yes                                     [facility]



     (PriLOSEC)      2-13                                              Health



     20 MG DR      00:00:                                              



     capsule      00                                                

 

     lovastatin      -0      Yes                                     [facility]



     ([facility])      2-13                                              [facility]



     20 MG      00:00:                                              



     tablet      00                                                

 

     lisinopril      -0      Yes                                     [facility]



     20 MG      2-13                                              Health



     tablet      00:00:                                              



               00                                                

 

     omeprazole      2-0      Yes                                     [facility]



     (PriLOSEC)      2-13                                              Health



     20 MG DR      00:00:                                              



     capsule      00                                                

 

     lovastatin      2-0      Yes                                     [facility]



     ([facility]or)      2-13                                              [facility]



     20 MG      00:00:                                              



     tablet      00                                                

 

     lisinopril      2-0      Yes                                     [facility]



     20 MG      2-13                                              Health



     tablet      00:00:                                              



               00                                                

 

     citalopram      2021      Yes            10mg QD   Take 10 mg           U

T



     (CeleXA) 10      2-06                               by mouth 1           [facility]

alth



     MG/5ML      09:17:                               (one) time           



     solution      45                                 each day.           

 

     methocarbam      2021      Yes            500mg Q.25D Take 500           

[facility]



     ol        2-06                               mg by           Health



     (Robaxin)      09:17:                               mouth 4           



     500 MG      45                                 (four)           



     tablet                                         times a           



                                                  day.           

 

     omeprazole      2021      Yes            10mg QD   Take 10 mg           [facility]

T



     (PriLOSEC)      2-06                               by mouth 1           [facility]

[facility]



     10 MG DR      09:17:                               (one) time           



     capsule      45                                 each day.           



                                                  Do not           



                                                  crush or           



                                                  chew.           

 

     gabapentin      2021      Yes                 [id number] Take by         

  [facility]



     (Neurontin)      2-                          [id number] mouth 3          

 [facility]



     250 MG/5ML      09:13:                          3D   (three)           



     solution      08                                 times a           



                                                  day.           

 

     lovastatin      2021      Yes            10mg      Take 10 mg           U

T



     (Mevacor)      2-06                               by mouth           Health



     10 MG      09:13:                               every           



     tablet      08                                 night.           

 

     tamsulosin      2021      Yes                 QD   Take by           [facility]



     (Flomax)      2-06                               mouth 1           Health



     0.4 MG 24      09:13:                               (one) time           



     hr capsule      08                                 each day.           

 

     lisinopril      2021      Yes            10mg QD   Take 10 mg           U

T



     10 MG      2-06                               by mouth 1           Health



     tablet      09:13:                               (one) time           



               07                                 each day.           

 

     gabapentin      2021      Yes                                     [facility]



     (Neurontin)                                                    Health



     300 MG      00:00:                                              



     capsule      00                                                

 

     gabapentin      2021      Yes                                     [facility]



     (Neurontin)                                                    Health



     300 MG      00:00:                                              



     capsule      00                                                

 

     gabapentin      2021      Yes                                     [facility]



     (Neurontin)                                                    Health



     300 MG      00:00:                                              



     capsule      00                                                

 

     gabapentin      2021      Yes                                     [facility]



     (Neurontin)                                                    Health



     300 MG      00:00:                                              



     capsule      00                                                

 

     gabapentin      2021      Yes                                     [facility]



     (Neurontin)                                                    Health



     300 MG      00:00:                                              



     capsule      00                                                

 

     gabapentin      2021      Yes                                     [facility]



     (Neurontin)                                                    [facility]



     300 MG      00:00:                                              



     capsule      00                                                

 

     gabapentin      2021      Yes                                     [facility]



     (Neurontin)                                                    Health



     300 MG      00:00:                                              



     capsule      00                                                

 

     gabapentin      2021      Yes                      See            [facility]



     300 mg oral      0-05                               Instructio           l



     capsule      13:27:                               ns, TAKE 1           [facility]

[facility]



               00                                 CAPSULE BY           



                                                  MOUTH           



                                                  TWICE           



                                                  DAILY, #           



                                                  [id number]            



                                                  unknown           



                                                  unit, 3           



                                                  Refill(s),           



                                                  Pharmacy:           



                                                  [facility]           



                                                  [facility]           



                                                  SERVICE,           



                                                  [id number],           



                                                  cm,            



                                                  21           



                                                  9:31:00           



                                                  CDT,           



                                                  Height,           



                                                  96.364,           



                                                  kg,            



                                                  21           



                                                  9:31:00           



                                                  CDT,           



                                                  Weight           

 

     albuterol            Yes       [id number] 2{puff}      Inhale 2        

   [facility]



     90        6-16                               Puffs           [facility]



     mcg/actuati      00:00:                               every 6           [facility]

[facility]



     on inhaler      00                                 (six)           [facility]



                                                  hours as           [facility]



                                                  needed for           



                                                  Wheezing.           

 

     benzonatate            Yes       [id number] 100mg      Take 1          

 [facility]



     (TESSALON      6-16                               capsule by           [facility] 

of



     PERLES) 100      00:00:                               mouth 3           [facility]

[facility]



     mg capsule      00                                 (three)           [facility]

l



                                                  times           [facility]



                                                  daily.           

 

     albuterol            Yes       [id number] 2{puff}      Inhale 2        

   [facility]



     90        6-16                               Puffs           ity of



     mcg/actuati      00:00:                               every 6           [facility]

[facility]



     on inhaler      00                                 (six)           [facility]



                                                  hours as           [facility]



                                                  needed for           



                                                  Wheezing.           

 

     benzonatate            Yes       [id number] 100mg      Take 1          

 [facility]



     (TESSALON      6-16                               capsule by           [facility] 

of



     PERLES) 100      00:00:                               mouth 3           [facility]

[facility]



     mg capsule      00                                 (three)           [facility]

l



                                                  times           [facility]



                                                  daily.           

 

     albuterol            Yes       [id number] 2{puff}      Inhale 2        

   [facility]



     90        6-16                               Puffs           ity of



     mcg/actuati      00:00:                               every 6           [facility]

[facility]



     on inhaler      00                                 (six)           [facility]



                                                  hours as           [facility]



                                                  needed for           



                                                  Wheezing.           

 

     benzonatate            Yes       [id number] 100mg      Take 1          

 [facility]



     (TESSALON      6-16                               capsule by           [facility] 

of



     PERLES) 100      00:00:                               mouth 3           [facility]

[facility]



     mg capsule      00                                 (three)           [facility]

l



                                                  times           [facility]



                                                  daily.           

 

     albuterol            Yes       [id number] 2{puff}      Inhale 2        

   [facility]



     90        6-16                               Puffs           ity of



     mcg/actuati      00:00:                               every 6           [facility]

as



     on inhaler      00                                 (six)           [facility]



                                                  hours as           [facility]



                                                  needed for           



                                                  Wheezing.           

 

     benzonatate            Yes       [id number] 100mg      Take 1          

 [facility]



     (TESSALON      6-16                               capsule by           ity 

of



     PERLES) 100      00:00:                               mouth 3           [facility]

[facility]



     mg capsule      00                                 (three)           [facility]

l



                                                  times           [facility]



                                                  daily.           

 

     benzonatate      2022- No             100mg      Take 100           

[facility]



     (Tessalon)      6-16 05-05                          mg by           Health



     100 MG      00:00: 00:00                          mouth.           



     capsule      00   :00                                          

 

     albuterol      2022- No                                      [facility]



     108 (90      6-16 05-05                                         [facility]



     Base)      00:00: 00:00                                         



     MCG/ACT      00   :00                                          



     inhaler                                                        

 

     doxycycline      -0 - No        [id number] 100mg      Take 1         

  [facility]



     hyclate 100      6-16 06-24                          tablet by           it

y of



     mg tablet      00:00: 04:59                          mouth 2           [place]

s



               00   :00                           (two)           [facility]



                                                  times           [place]



                                                  daily for           



                                                  7 days.           

 

     doxycycline      0 1- No        [id number] 100mg      Take 1         

  [facility]



     hyclate 100      6-16 06-24                          tablet by           it

y of



     mg tablet      00:00: 04:59                          mouth 2           [place]

[facility]



               00   :00                           (two)           [facility]



                                                  times           [place]



                                                  daily for           



                                                  7 days.           

 

     citalopram      0      Yes                                     [facility]



     20 mg      5-26                                              ity of



     tablet      00:00:                                              [place]



                                                               [facility]

 

     lisinopriL      -0      Yes                                     [facility]



     20 mg      5-26                                              ity of



     tablet      00:00:                                              [address]

 

     lovastatin      -0      Yes                                     [facility]



     20 mg      5-26                                              ity of



     tablet      00:00:                                              [address]

 

     omeprazole      -0      Yes                                     [facility]



     20 mg      5-26                                              ity of



     capsule      00:00:                                              [address]

 

     tamsulosin      0      Yes                                     [facility]



     0.4 mg 24      5-26                                              ity of



     hr capsule      00:00:                                              [address]

 

     citalopram      -0      Yes                                     [facility]



     20 mg      5-26                                              ity of



     tablet      00:00:                                              [address]

 

     lisinopriL      -0      Yes                                     [facility]



     20 mg      5-26                                              ity of



     tablet      00:00:                                              [address]

 

     lovastatin      -0      Yes                                     [facility]



     20 mg      5-26                                              ity of



     tablet      00:00:                                              [address]

 

     omeprazole      -0      Yes                                     [facility]



     20 mg      5-26                                              ity of



     capsule      00:00:                                              [address]

 

     tamsulosin      -0      Yes                                     [facility]



     0.4 mg 24      5-26                                              ity of



     hr capsule      00:00:                                              [address]

 

     citalopram      -0      Yes                                     [facility]



     20 mg      5-26                                              ity of



     tablet      00:00:                                              [address]

 

     lisinopriL      -0      Yes                                     [facility]



     20 mg      5-26                                              ity of



     tablet      00:00:                                              [address]

 

     lovastatin      -0      Yes                                     [facility]



     20 mg      5-26                                              ity of



     tablet      00:00:                                              [address]

 

     omeprazole      -0      Yes                                     [facility]



     20 mg      5-26                                              ity of



     capsule      00:00:                                              [address]

 

     tamsulosin      -0      Yes                                     [facility]



     0.4 mg 24      5-26                                              ity of



     hr capsule      00:00:                                              [address]

 

     citalopram      -0      Yes                                     [facility]



     20 mg      5-26                                              ity of



     tablet      00:00:                                              [address]

 

     lisinopriL      -0      Yes                                     [facility]



     20 mg      5-26                                              ity of



     tablet      00:00:                                              [address]

 

     lovastatin      -0      Yes                                     [facility]



     20 mg      5-26                                              ity of



     tablet      00:00:                                              [address]

 

     omeprazole      -0      Yes                                     [facility]



     20 mg      5-26                                              ity of



     capsule      00:00:                                              [address]

 

     tamsulosin      -0      Yes                                     [facility]



     0.4 mg 24      5-26                                              ity of



     hr capsule      00:00:                                              [address]

 

     gabapentin      -0      Yes                                     [facility]



     300 mg      5-23                                              ity of



     capsule      00:00:                                              [address]

 

     gabapentin      -0      Yes                                     [facility]



     300 mg      5-23                                              ity of



     capsule      00:00:                                              [address]

 

     gabapentin      -0      Yes                                     [facility]



     300 mg      5-23                                              ity of



     capsule      00:00:                                              [address]

 

     gabapentin      -0      Yes                                     [facility]



     300 mg      5-23                                              ity of



     capsule      00:00:                                              [address]

 

     methocarbam            Yes                      500 mg,           [facility]

[facility]



     ol 500 mg      6-02                               PO,            l



     oral tablet      14:13:                               Bedtime, X           

[facility]



               00                                 90 day, #           



                                                  90 tab, 1           



                                                  Refill(s),           



                                                  Pharmacy:           



                                                  [facility]           



                                                  [facility]           



                                                  SERVICE           

 

     gabapentin            Yes                      300 mg,           [facility]

[facility]



     300 MG Oral      6-02                               PO, BID, #           l



     Capsule      14:13:                               180 cap, 1           [facility]

kvng



               00                                 Refill(s),           



                                                  Pharmacy:           



                                                  [facility]           



                                                  MAIL           



                                                  SERVICE           

 

     gabapentin      2019      No                       300 mg,           [facility]

[facility]



     300 MG Oral      0-09                               PO, BID, #           l



     Capsule      12:52:                               180 cap, 2           [facility]

kv[facility]



               15                                 Refill(s),           



                                                  Pharmacy:           



                                                  [facility]           



                                                  MAIL           



                                                  SERVICE           

 

     methocarbam      2019      No                       500 mg,           [facility]

[facility]



     ol 500 mg      0-09                               PO,            l



     oral tablet      12:52:                               Bedtime, X           

[facility]



               07                                 90 day, #           



                                                  90 tab, 2           



                                                  Refill(s),           



                                                  Pharmacy:           



                                                  [facility]           



                                                  MAIL           



                                                  SERVICE           

 

     methocarbam            No                       500 mg,           [facility]

[facility]



     ol 500 mg      9-06                               PO,            l



     oral tablet      16:02:                               Bedtime, X           

[facility]



               12                                 90 day, #           



                                                  90 tab, 2           



                                                  Refill(s),           



                                                  Pharmacy:           



                                                  [facility]           



                                                  MAIL           



                                                  SERVICE           

 

     gabapentin            No                       300 mg,           [facility]

manuela



     300 MG Oral      9-06                               PO, BID, X           l



     Capsule      16:01:                               90 day, #           Mahi

nn



               59                                 180 cap, 2           



                                                  Refill(s),           



                                                  Pharmacy:           



                                                  [facility]           



                                                  MAIL           



                                                  SERVICE           

 

     gabapentin      2019-0      No                       300 mg,           [facility]

manuela



     300 MG Oral      6-07                               PO, BID, X           l



     Capsule      14:04:                               30 day, #           Mahi

nn



               00                                 60 cap, 8           



                                                  Refill(s),           



                                                  Pharmacy:           



                                                  [place] [id number]            

 

     methocarbam      2019-0      No                       500 mg,           [facility]

[facility]



     ol 500 mg      6-07                               PO,            l



     oral tablet      14:04:                               Bedtime, X           

Bertrand



               00                                 30 day, #           



                                                  30 tab, 3           



                                                  Refill(s),           



                                                  Pharmacy:           



                                                  [place] [id number]            

 

     tamsulosin      -0      Yes                      0.4 mg,           [facility]

manuela



               5-30                               PO, Daily,           l



               14:15:                               0                                               Refill(s)           

 

     Methocarbam      2019-0      Yes                      500 mg,           [facility]

[facility]



     ol        5-30                               PO, PRN           l



               14:08:                                                                                              

 

     gabapentin      2019-0      Yes                      300 mg,           [facility]

manuela



               5-30                               PO, PRN, 0           l



               14:08:                               Refill(s)                                                           

 

     Lisinopril Lisinopril 0      Yes            1    QD   TAKE 1          

 [facility]



     20 MG Oral 20 MG Oral 2-12                               TABLET           P

[facility]



     Tablet Tablet 00:00:                               DAILY           ans



               00                                                

 

     albuterol albuterol           No                       albuterol           

[place]



     sulfate HFA sulfate HFA                                    sulfate         

  [facility]



     90   90                                      HFA 90           [facility]



     mcg/actuati mcg/actuati                                    mcg/actuat      

     



     on aerosol on aerosol                                    ion            



     inhaler inhaler                                    aerosol           



                                                  inhaler           

 

     citalopram citalopram           No                       citalopram        

   [place]



     20 mg 20 mg                                    20 mg           [facility]



     tablet tablet                                    tablet           [facility]

 

     doxycycline doxycycline           No                       doxycyclin      

     [place]



     hyclate 100 hyclate 100                                    e hyclate       

    [facility]



     mg tablet mg tablet                                    100 mg           [facility]

[facility]



                                                  tablet           

 

     gabapentin gabapentin           No                       gabapentin        

   [place]



     300 mg 300 mg                                    300 mg           [facility]



     capsule capsule                                    capsule           [facility]

y

 

     lisinopril lisinopril           No                       lisinopril        

   [place]



     20 mg 20 mg                                    20 mg           [facility]



     tablet tablet                                    tablet           [facility]

 

     lovastatin lovastatin           No                       lovastatin        

   [place]



     20 mg 20 mg                                    20 mg           [facility]



     tablet tablet                                    tablet           [facility]

 

     omeprazole omeprazole           No                       omeprazole        

   [place]



     20 mg 20 mg                                    20 mg           [facility]



     capsule,del capsule,del                                    capsule,de      

     [facility]



     ayed ayed                                    layed           



     release release                                    release           

 

     tamsulosin tamsulosin           No                       tamsulosin        

   [place]



     0.4 mg 0.4 mg                                    0.4 mg           [facility]



     capsule capsule                                    capsule           [facility]

y







Immunizations







           Ordered Immunization Filled Immunization Date       Status     Commen

ts   Source



           Name       Name                                        

 

           Influenza,            2022 Completed             [facility]



           injectable,            00:00:00                         



           quadrivalent                                             

 

           Influenza,            2022 Completed             [facility]



           injectable,            00:00:00                         



           quadrivalent                                             

 

           Influenza,            2022 Completed             [facility]



           injectable,            00:00:00                         



           quadrivalent                                             

 

           Influenza,            2022 Completed             [facility]



           injectable,            00:00:00                         



           quadrivalent                                             

 

           Influenza,            2022 Completed             [facility]



           injectable,            00:00:00                         



           quadrivalent                                             

 

           Influenza,            2022 Completed             [facility]



           injectable,            00:00:00                         



           quadrivalent                                             

 

           Influenza,            2022 Completed             [facility]



           injectable,            00:00:00                         



           quadrivalent                                             

 

           COVID-19 Moderna 18            2021 Completed             [facility]

[facility]



           & Over Vaccination            00:00:00                         

 

           COVID-19 Moderna 18            2021 Completed             [facility]

[facility]



           & Over Vaccination            00:00:00                         

 

           COVID-19 Moderna 18            2021 Completed             [facility]

[facility]



           & Over Vaccination            00:00:00                         

 

           COVID-19 Moderna 18            2021 Completed             [facility]

[facility]



           & Over Vaccination            00:00:00                         

 

           COVID-19 Moderna 12            2021 Completed             [facility]

[facility]



           & Over Vaccination            00:00:00                         



           ()                                              

 

           COVID-19 Moderna 12            2021 Completed             [facility]

[facility]



           & Over Vaccination            00:00:00                         



           ()                                              

 

           COVID-19 Moderna 12            2021 Completed             [facility]

[facility]



           & Over Vaccination            00:00:00                         



           ()                                              

 

           COVID-19, mRNA, COVID-19, mRNA, 2021 Completed             [facility]

[facility]



           LNP-S, PF, 100 LNP-S, PF, 100 00:00:00                         [facility]

y



           mcg/0.5 mL dose mcg/0.5 mL dose                                  

 

           SARS-CoV-2COVID-19            2021 Completed             [facility]

[facility]



           [id number]            19:07:55                         [facility]

[facility]



           <sup>1, 2</sup>                                             

 

           SARS-CoV-2COVID-19            2021 Completed             [facility]

[facility]



           [id number]            00:00:00                         [facility]

[facility]

 

           SARS-CoV-2COVID-19mR            2021 Completed             [facility]

riamatt



           [id number]            00:00:00                         [facility]

kv[facility]







Vital Signs







             Vital Name   Observation Time Observation Value Comments     Source

 

             Systolic blood 2022   126 mm[Hg]                [facility]



             pressure     13:51:00                               

 

             Diastolic blood 2022   77 mm[Hg]                 [facility]



             pressure     13:51:00                               

 

             Heart rate   2022   66 /min                   [facility]



                          13:51:00                               

 

             Body temperature 2022   36.44 Fannie                 [facility]



                          13:51:00                               

 

             Body weight  2022   96.435 kg                 [facility]



                          13:51:00                               

 

             BMI          2022   30.95 kg/m2               [facility]



                          13:51:00                               

 

             Systolic blood 2022   128 mm[Hg]                [facility]



             pressure     18:51:00                               

 

             Diastolic blood 2022   84 mm[Hg]                 [facility]



             pressure     18:51:00                               

 

             Heart rate   2022   74 /min                   [facility]



                          18:51:00                               

 

             Body temperature 2022   36.22 Fannie                 [facility]



                          18:51:00                               

 

             Body weight  2022   96.163 kg                 [facility]



                          18:51:00                               

 

             BMI          2022   30.86 kg/m2               [facility]



                          18:51:00                               

 

             Oxygen saturation 2022   96 /min                   [facility]



             in Arterial blood 18:51:00                               



             by Pulse oximetry                                        

 

             BP Diastolic 2021   78 mm[Hg]                 [place] [facility]



                          00:00:00                               [facility]

 

             Height       2021   69 [in_i]                 [place] [facility]



                          00:00:00                               [facility]

 

             BMI (Body Mass 2021   31 kg/m2                  [facility]



             Index)       00:00:00                               [facility]

 

             BP Systolic  2021   145 mm[Hg]                [facility]



                          00:00:00                               [facility]

 

             Body Weight  2021   210 [lb_av]               [place] [facility]



                          00:00:00                               [facility]

 

             Systolic blood 2021   127 mm[Hg]                [facility]



             pressure     15:55:00                               [facility]

 

             Diastolic blood 2021   72 mm[Hg]                 [facility]

f



             pressure     15:55:00                               [facility]

 

             Heart rate   2021   83 /min                   [facility]



                          15:54:00                               [facility]

 

             Body temperature 2021   36.72 Fannie                 [facility] 





                          15:54:00                               [facility]

 

             Respiratory rate 2021   18 /min                   [facility]



                          15:54:00                               [facility]

 

             Body height  2021   176.5 cm                  [facility]



                          15:54:00                               [facility]

 

             Body weight  2021   96.616 kg                 [facility]



                          15:54:00                               [facility]

 

             BMI          2021   31.00 kg/m2               [facility]



                          15:54:00                               [facility]

 

             Oxygen saturation 2021   96 /min                   [facility]



             in Arterial blood 15:54:00                               [facility]

[facility]



             by Pulse oximetry                                        [place]

 

             Systolic (mm Hg) 2022                             [facility]

[facility]



                          13:55:00                               

 

             Diastolic (mm Hg) 2022                             [facility]

[facility]



                          13:55:00                               

 

             Heart Rate   2022                             [facility]

n



                          13:55:00                               

 

             Respitory Rate 2022                             [facility]

[facility]



                          13:55:00                               

 

             Height       2022   175.26 cm                 [facility]

n



                          13:55:00                               

 

             Weight       2022                             [facility]

n



                          13:55:00                               

 

             BMI Calculated 2022                             [facility]

[facility]



                          13:55:00                               

 

             Systolic (mm Hg) 2021                             [facility]

[facility]



                          14:18:00                               

 

             Diastolic (mm Hg) 2021                             [facility]

[facility]



                          14:18:00                               

 

             Heart Rate   2021                             [facility]

n



                          14:18:00                               

 

             Respitory Rate 2021                             [facility]

[facility]



                          14:18:00                               

 

             Height       2021   175.26 cm                 [facility]

n



                          14:18:00                               

 

             Weight       2021                             [facility]

n



                          14:18:00                               

 

             BMI Calculated 2021                             [facility]

[facility]



                          14:18:00                               

 

             Systolic (mm Hg) 2020                             [facility]

[facility]



                          14:01:00                               

 

             Diastolic (mm Hg) 2020                             [facility]

[facility]



                          14:01:00                               

 

             Heart Rate   2020                             [facility]

n



                          14:01:00                               

 

             Respitory Rate 2020                             [facility]

[facility]



                          14:01:00                               

 

             Height       2020   177.8 cm                  [facility]

n



                          14:01:00                               

 

             Weight       2020                             [facility]

n



                          14:01:00                               

 

             BMI Calculated 2020                             [facility]

[facility]



                          14:01:00                               

 

             BP Systolic  2019   142 mm[Hg]   Location: [facility]; [facility]



                          14:41:00                  Position:    



                                                    Sitting      

 

             BP Diastolic 2019   81 mm[Hg]    Location: [facility]; [facility]



                          14:41:00                  Position:    



                                                    Sitting      

 

             Height       2019   69.5 [in_us]              [facility]



                          14:41:00                               

 

             Weight       2019   214.125 [lb_av]              [facility]

s



                          14:41:00                               

 

             Body Mass Index 2019   31.17 kg/m2               [facility]

s



             Calculated   14:41:00                               

 

             Temperature  2019   98.1 [degF]  Method: Oral [facility]



                          14:41:00                               

 

             Heart Rate   2019   69 /min      Location: R  [facility]



                          14:41:00                  Brachial     



                                                    Artery;      

 

             O2 SAT       2019   99 %         Source: RA   [facility]



                          14:41:00                               

 

             BP Systolic  2019   136 mm[Hg]   Location: [facility]; [facility]



                          14:30:00                  Position:    



                                                    Sitting      

 

             BP Diastolic 2019   73 mm[Hg]    Location: [facility]; [facility]



                          14:30:00                  Position:    



                                                    Sitting      

 

             Height       2019   69.5 [in_us]              [facility]



                          14:30:00                               

 

             Weight       2019   215.375 [lb_av]              [facility]

s



                          14:30:00                               

 

             Body Mass Index 2019   31.35 kg/m2               [facility]

s



             Calculated   14:30:00                               

 

             Temperature  2019   98.3 [degF]  Method: Oral [facility]



                          14:30:00                               

 

             Heart Rate   2019   85 /min      Location: R  [facility]



                          14:30:00                  Brachial     



                                                    Artery;      

 

             O2 SAT       2019   97 %         Source: RA   [facility]



                          14:30:00                               

 

             Systolic (mm Hg) 2019                             [facility]

[facility]



                          13:58:00                               

 

             Diastolic (mm Hg) 2019                             [facility]

[facility]



                          13:58:00                               

 

             Heart Rate   2019                             [facility]

n



                          13:58:00                               

 

             Weight       2019                             [facility]

n



                          13:58:00                               

 

             Height       2019   175.26 cm                 [facility]

n



                          13:58:00                               

 

             Respitory Rate 2019                             [facility]

[facility]



                          13:58:00                               

 

             BMI Calculated 2019                             [facility]

[facility]



                          13:58:00                               

 

             BP Systolic  2019   118 mm[Hg]   Location: [facility]; [facility]



                          15:20:00                  Position:    



                                                    Sitting      

 

             BP Diastolic 2019   70 mm[Hg]    Location: [facility]; [facility]



                          15:20:00                  Position:    



                                                    Sitting      

 

             Height       2019   69.5 [in_us]              [facility]



                          15:20:00                               

 

             Weight       2019   212.25 [lb_av]              [facility]



                          15:20:00                               

 

             Body Mass Index 2019   30.89 kg/m2               [facility]

s



             Calculated   15:20:00                               

 

             Temperature  2019   98.5 [degF]  Method: Oral [facility]



                          15:20:00                               

 

             Heart Rate   2019   73 /min      Location: R  [facility]



                          15:20:00                  Brachial     



                                                    Artery;      

 

             O2 SAT       2019   97 %         Source: JOSE   [facility]



                          15:20:00                               

 

             BP Systolic  2019   144 mm[Hg]   Location: [facility]; [facility]



                          14:20:00                  Position:    



                                                    Sitting      

 

             BP Diastolic 2019   80 mm[Hg]    Location: [facility]; [facility]



                          14:20:00                  Position:    



                                                    Sitting      

 

             Height       2019   69.5 [in_us]              [facility]



                          14:20:00                               

 

             Weight       2019   211 [lb_av]               [facility]



                          14:20:00                               

 

             Body Mass Index 2019   30.71 kg/m2               [facility]

s



             Calculated   14:20:00                               

 

             Temperature  2019   98.3 [degF]  Method: Oral [facility]



                          14:20:00                               

 

             Heart Rate   2019   74 /min      Location: R  [facility]



                          14:20:00                  Brachial     



                                                    Artery;      

 

             O2 SAT       2019   98 %         Source: RA   [facility]



                          14:20:00                               

 

             BP Systolic  2017   126 mm[Hg]   Location: LUE; [facility]



                          08:01:00                  Position:    



                                                    Sitting      

 

             BP Diastolic 2017   77 mm[Hg]    Location: LUE; [facility]



                          08:01:00                  Position:    



                                                    Sitting      

 

             Height       2017   69.5 [in_us]              [facility]



                          08:01:00                               

 

             Weight       2017   214 [lb_av]               [facility]



                          08:01:00                               

 

             Body Mass Index 2017   31.15 kg/m2               [facility]

s



             Calculated   08:01:00                               

 

             Temperature  2017   97.5 [degF]  Method: Oral [facility]



                          08:01:00                               

 

             Heart Rate   2017   75 /min                   [facility]



                          08:01:00                               

 

             Respiration Rate 2017   14 /min                   [facility]

ns



                          08:01:00                               







Procedures







                Procedure       Date / Time     Performing Clinician Source



                                Performed                       

 

                OCT, RETINA - OU - BOTH [facility]



                [facility]                                            

 

                OCT, RETINA - [facility] BOTH [facility]37[facility]



                [facility]                                            

 

                FUNDUS PHOTOS - OU - 2022 13:37:41 [facility]



                BOTH EYES                                       

 

                PTOSIS VISUAL FIELD, 2021 14:40:37 Leonie        [facility]

th



                LIMITED - OU - BOTH EYES                 Ore-Ofeoluwatomi 

 

                CT, abdomen + pelvis, 2021 00:00:00                 Linda

n [place]



                w/wo contrast                                   Urology

 

                XR CHEST 2 VW   2021 17:31:02 Fe Funes [facility]

 

                COVID-19 (MOLECULAR 2021 16:45:00 Fe Funes [facility]



                 NUCLEIC ACID                                   



                AMPLIFICATION)                                  

 

                Polysomnography, sleep 2019 00:00:00                 [facility]



                staging with 4+                                 



                parameters of sleep,                                 



                attended by a                                   



                technologist                                    

 

                [U] XRAY THORACOLUMBAR 2018 00:00:00                 [facility]



                SPINE, SCOLIOSIS STUDY                                 



                (W/ SUPINE AND ERECT)                                 



                [id number]                                           

 

                Physical Therapy 2018 00:00:00                 [facility]

ns

 

                [UTP] EMG       2017 00:00:00                 [facility]

s

 

                MRI Spine lumbar wo 2017 00:00:00                 [facility]

cians



                contrast 56099                                  

 

                Diagnostic Colonoscopy 2015 00:00:00                 [place]

[facility]



                                                                Urology

 

                Carpal Tunnel Surgery 2015 00:00:00                 [place]

[facility]



                                                                Urology

 

                Hernia Repair W/mesh 1991 00:00:00                 Jacoby Mckeon



                                                                Urolog[facility]oreen

 

                History of Carpal tunnel                                 [facility]



                surgery                                         

 

                Colonoscopy                                     [facility]

 

                Repair of umbilical                                 [facility]



                hernia                                          







Plan of Care







             Planned Activity Planned Date Details      Comments     Source

 

             Diagnostic Test 2021   cytology, urine              Dozier M

etro



             Pending      00:00:00     [code = cytology,              Urology



                                       urine]                    

 

             Diagnostic Test 2021   culture, urine +              [facility] 

[facility]



             Pending      00:00:00     sensitivity [code =              Urology



                                       culture, urine +              



                                       sensitivity]              

 

             Diagnostic Test 2021   urinalysis, dipstick              [place]

[facility]



             Pending      00:00:00     [code = urinalysis,              Urology



                                       dipstick]                 

 

             Diagnostic Test 2021   PSA, total + free,              [place]

[facility]



             Pending      00:00:00     serum or plasma              Urology



                                       [code = PSA, total +              



                                       free, serum or              



                                       plasma]                   

 

             Diagnostic Test 2021   unlisted lab [code =              [facility]

[facility]



             Pending      00:00:00     unlisted lab]              Urology

 

             Diagnostic Test 2018   [UTP] EMG [code =              [facility]

[facility]



             Pending      00:00:00     [UTP] EMG]                

 

             Diagnostic Test 2018   [UTP] EMG [code =              [facility]

[facility]



             Pending      00:00:00     [UTP] EMG]                







Encounters







        Start   End     Encounter Admission Attending Care    Care    Encounter 

Source



        Date/Time Date/Time Type    Type    Clinicians Facility Department ID   

   

 

        2022         Outpatient                 [facility]     [id number]

 [facility]



        08:34:10                                                 [id number] [facility]

 

        2022         Outpatient                 [facility]     [id number]

 [facility]



        19:05:21                                                 [id number] [facility]

 

        2022         Outpatient                 [facility]     [id number]

 [facility]



        09:23:21                                                 [id number] [facility]

 

        2022         Outpatient                 [facility]     [id number]

 [facility]



        09:46:43                                                 [id number] [facility]

 

        2022         Outpatient                 [facility]     [id number]

 [facility]



        13:51:16                                                 [id number] [facility]

 

        2021         Outpatient         LEONIE, [facility]     [id number]

 [facility]



        [facility]30[facility]23                         [facility]



                                        ATOM                           

 

        2021         Outpatient                 [facility]     [id number] 

[facility]



        07:43:34                                                         Health

 

        2021         Outpatient         LEONIE, [facility]     [id number]

 [facility]



        10[facility]50[facility]51                         [facility]



                                        ATOMI                           

 

        2021         Outpatient         FABY [facility]     [id number]

 [facility]



        08:23:17                         [facility]

 

        2023 Outpatient         RODRIGO, [facility]     [id number]

[id number] [facility]



        09:30:00 09:30:00                 ANGELICA teixeira

 

        2023 Outpatient                 OGPI    [facility]    [id number]

[id number] [facility]



        09:15:00 09:15:00                                         06      [place]att [place]

 

        2022 Office          RODRIGO, [facility] 6400 1.2.840.114 [id number]

[id number] [place]



        09:30:00 13:07:13 Visit           ANGELICA TREVINO 350.1.13.58        

 Health



                                                        9.2.7.2.686         



                                                        [phone]         



                                                        4               

 

        2022 Outpatient                 UTH     UTH     [id number]

[id number] [facility]



        09:20:00 12:57:22                                                 Health

 

        2022 Outpatient         RODRIGO, [facility]     [id number]

[id number] [facility]



        08:15:00 08:15:00                 ANGELICA Velazquez



 

        2022 Office          FABY [place] 6400 [id number]

[address] [place]



        10:00:00 15:34:00 Visit           TRISH TREVINO 350.1.13.58         

Health



                                                        9.2.7.2.686         



                                                        [phone]         



                                                        4               

 

        2022 Outpatient         ANTONY,  [facility]     [id number]

[id number] [place]



        13:00:00 13:00:00                 [facility]

 

        2022 Office          [place] Watters     1.2.840.114 [id number]

8182 [place]



        09:00:00 09:54:58 Visit           Stefan VASQUEZ  350.1.13.58         He

alth



                                                VILLAGE 9.2.7.2.686         



                                                MULTI   [phone]         



                                                SPECIALTY 2               

 

        2022 Telephone         [place] Dillard     1.2.005.230 [id number]

[address]56 [place]



        00:00:00 00:00:00                 Diego VASQUEZ  350.1.13.58         He

alth



                                                VILLAGE 9.2.7.2.686         



                                                MULTI   [phone]         



                                                SPECIALTY 6               

 

        202217 Telephone         [place] Dillard     1.2.001.423 [id number]

[address]138 [place]



        00:00:00 00:00:00                 Diego VASQUEZ  350.1.13.58         He

alth



                                                VILLAGE 9.2.7.2.686         



                                                MULTI   [phone]         



                                                SPECIALTY 6               

 

        2022 Office          Rodrigo, [facility] 6400 1.2.840.114 [id number]

[id number] [place]



        08:00:00 09:10:19 Visit           Angelica RICH .1.13.58        

 Health



                                                        9.2.7.2.686         



                                                        [phone]         



                                                        4               

 

        2022 Outpatient                 nullFlavo [facility]     [id number]

[id number] [facility]



        14:00:00 04:59:59                         r       Neurology 05      l



                                                        [place]

 

        2022 Outpatient         JUAN GuerraMISCHER MHMISCHER 610

[id number] 



        09:00:00 23:59:59                 Sanju                   Herlinda Bautista                         

 

        2022 Outpatient                 [facility]    [facility]    [id number]

[id number] [facility]



        09:00:00 09:00:00                                         05      l



                                                                        Shmuel

 

        2022-05-10 2022 Outpt Diag                 nullFlavo [facility]    [id number]

[id number] [facility]



        19:26:00 04:59:00 Services                 r       Outpatient 00      l



                                                        Imaging         [facility]         

 

        2022-05-10 2022-05-10 Outpatient         Luis,   [facility]   [facility]   [id number]

[id number] 



        14:26:00 23:59:00                 Diego                  Orlando Tinoco                         

 

        2022 Office          [place] Dillard     1.2.840.114 [id number]

848 [place]



        13:40:00 14:26:51 Visit           Diego VASQUEZ  350.1.13.58         Luis A amaral



                                                VILLAGE 9.2.7.2.686         



                                                MULTI   [phone]         



                                                SPECIALTY 6               

 

        2022 Office          Rodrigo, [facility] 6400 1.2.840.114 [id number]

[id number] [place]



        09:00:00 10:17:03 Visit           Angelica RICH .1.13.58        

 Health



                                                        9.2.7.2.686         



                                                        [phone]         



                                                        4               

 

        2022 Office          OH Wallace 6400 1.2.840.114 [id number]

[id number] [place]



        08:00:00 08:15:00 Visit           Angelica TREVINO 350.1.13.58        

 Health



                                                        9.2.7.2.686         



                                                        [phone]         



                                                        4               

 

        2022 Office          OH Wallace 6400 1.2.840.114 [id number]

[id number] [place]



        09:45:00 15:56:50 Visit           Angelica TREVINO 350.1.13.58        

 Health



                                                        9.2.7.2.686         



                                                        [phone]         



                                                        4               

 

        2022 Outpatient FABIENNE HAMPTON, [facility]    [id number]

[id number] [facility]



        13:40:00 13:56:56                 MEHREEN candelaria

[facility]

 

        2022 Outpatient FABIENNE SUGGS   [facility]    [id number]

873 [facility]



        13:40:00 13:40:00                 CELINA castro [facility]

 

        2021 Office          OH Hadley 6400 1.2.338.388 [id number]

[address] [place]



        08:30:00 09:29:17 Visit           Alan RICH .1.13.58     

    Health



                                        atomi           9.2.7.2.686         



                                                        [phone]         



                                                        4               

 

        2021 Office          [place] Hadley 6400 1.2.410.208 [id number]

[address] [place]



        08:30:00 09:29:17 Visit           Alan TREVINO 350.1.13.58     

    Health



                                        atomi           9.2.7.2.686         



                                                        [phone]         



                                                        4               

 

        2021 Outpatient         Goldfarb_D U     [facility]     4577

 [place]



        12:41:00 12:41:00                                         [id number]   [place]



                                                                        Urology

 

        2021 Outpatient         Goldfarb_D U     [facility]     4577

 [place]



        09:52:00 09:52:00                                         [id number]   [facility]



                                                                        Urology

 

        2021 Outpatient         Goldfarb_D [facility]     U     4577

 [place]



        10:45:00 10:45:00                                         [id number]   [facility]



                                                                        Urology

 

        2021 Outpatient         [facility]D [facility]     [facility]     4577

 [place]



        11:49:00 11:49:00                                         [id number]   [facility]



                                                                        Urology

 

        2021 Outpatient         [facility]farb_D [facility]     U     4577

 [place]



        10:58:00 10:58:00                                         [id number]   [facility]



                                                                        Urology

 

        2021 [place]am                   [facility]     [place] -    [id number] LEEANN vidal



        00:00:00 00:00:00 Mike Crowley: [address]                         [facility]         



                        [place] [id number]          



                        [address]                                           



                        [address],                                           



                        [place], [place]                                              



                        [address]                                         



                        , Ph.                                           



                        [phone]                                         

 

        2021 Outpatient         Shiv, [facility]     U     [id number]

[id number] 



        00:00:00 00:00:00                 Willam Painting                 [id number]

 



                                                                [id number] 



                                                                [id number]  

 

        2021 Outpatient         Goldfarb_D [facility]     U     4577

 [place]



        11:26:00 11:26:00                                         [id number]   [facility]



                                                                        Urology

 

        2021 Outpatient         [facility]farb_D [facility]     U     4577

 [place]



        10:11:00 10:11:00                                         [id number]   [place]



                                                                        Urology

 

        2021 [facility]         [facility]    1.2.840.114 [id number]

719 [facility]



        11:45:00 23:59:00 Encounter         Fe Manriquez 350.1.13.10       

  ity of



                                                Tilly 4.2.7.2.686         [facility]  [phone]         Medi

marlene



                                                        [address]             [address]anch

 

        2021 Urgent          Provider, [facility]    

1.2.840.114 

[id number]                                Univers



        10:21:38 11:55:32 Care            Fe Funes  350.1.13.10  

       ity of



                                                [place] 4.2.7.2.686         Alejandro

as



                                                Professio [phone]         [address]



                                                Office                  



                                                Building                 



                                                One                     

 

        2021 Outpatient R       ANNABEL, [facility]    [id number]

762 [facility]



        10:20:00 10:20:00                 FE                          ity of



                                                                        [facility]

 

        2021 Letter          Doctor  GAYLE    1.2.840.114 [id number]

70 [facility]



        00:00:00 00:00:00 (Out)           Unassigned, LIMA   350.1.13.10       

  ity of



                                        [facility] 4.2.7.2.686         Alejandro

as



                                                        [phone]         [address]

 

        2021 Letter          Doctor  GAYLE    1.2.840.114 [id number]

71 [facility]



        00:00:00 00:00:00 (Out)           Unassigned, LIMA   350.1.13.10       

  ity of



                                        [facility] 4.2.7.2.686         Alejandro

as



                                                        [phone]         [address]

 

        2021 Outpatient                 nullFlavo [facility]     [id number]

[id number] [facility]



        14:00:00 04:59:59                         r       Neurology 04      l



                                                        [place]

 

        2021 Outpatient         ARMINDA GuerraSCHER JUANMISCHER 610

[id number] 



        09:00:00 23:59:59                 Sanju Bautista                         

 

        2021 Ambulatory                 nullFlavo [facility]     [id number]

[id number] [facility]



        14:00:00 14:00:00 Pre-Reg                 r       Neurology 03      l



                                                        [place]

 

        2021 Outpatient                 [facility]    [facility]    [id number]

[id number] [facility]



        09:00:00 09:00:00                                         04      matt Mi

 

        2021 Outpatient                 [facility]    [facility]    [id number]

665 [facility]



        09:00:00 09:00:00                                         03      matt Mi

 

        2021 Outpatient         ARMINDA GuerraSCHER MHMISCHER 610

[id number] 



        09:00:00 09:00:00                 Sanju                   03      



                                        Michele                         

 

        2020 Outpatient                 nullFlavo [facility]     [id number]

[id number] [facility]



        14:00:00 04:59:59                         r       Neurology 02      l



                                                        [place]         Shmu[place]

 

        2020 Outpatient         Mari,  MHMISCHER MHMISCHER 610

[id number] 



        09:00:00 23:59:59                 Sanju                   02      



                                        Michele                         

 

        2020 Outpatient                 [facility]    [facility]    [id number]

665 [facility]



        09:00:00 09:00:00                                         02      [place]

 

        [facility]     [id number]

1 [facility]



        13:00:00 13:00:00 t; DIEGO DILLARD Physi ci JUSTIN, M.D. ans M.D.                                            

 

        2019 [facility]   [facility]

[address] [place]



        14:40:00 14:40:00 t; DIEGO DILLARD lty            Physi

NICHOLAS Kay M.D.                                            

 

        2019 Christopher [facility]ILLARD   [facility]

[address] [place]



        14:40:00 14:40:00 t; DIEGO DILLARD lty -           Physi

NICHOLAS Kay M.D.                                            

 

        2019 Outpatient                 nullFlavo [facility]     [id number]

[id number] [facility]



        14:00:00 04:59:59                         r       Neurology 01      l



                                                        [place]

 

        2019 Outpatient         Mari,  MHMISCHER MHMISCHER 610

[id number] 



        09:00:00 23:59:59                 Sanju                   01      



                                        Michele                         

 

        2019 Outpatient                 [facility]    [facility]    [id number]

665 [facility]



        09:00:00 09:00:00                                         01      [place]

 

        2019 [place]  [id number]

0 [place]



        15:40:00 15:40:00 t; DIEGO DILLARD [place] [facility] JUSTIN, M.D. ans M.D.                                            

 

        2019 [place]  [id number]

8 [place]



        14:20:00 14:20:00 t; DIEGO DILLARD [place]         Physi

ci



                        NICHOLAS GARCÍA M.D.                                            

 

        2018 [place]     [facility]     [id number]

0 [place]



        11:00:00 11:00:00 t; NAJMA SHANNON         Orthopedics    

     Miguel brandt M.D.                                          

 

        2018 Outpatient                 [facility]    [id number]

[address] [facility]



        09:00:00 09:00:00                                         00      l



                                                                        [place]

 

        [facility]         [facility] [place]     [id number]

1 [place]



        13:30:00 13:30:00 t; NAJMA SHANNON Physici SHAH-NAWAZ M.D. ans, M.D.                                          

 

        2018 [place] [facility]     Neurology 37

[id number] [place]



        14:30:00 14:30:00 t;              NICHOLAS SAAVEDRA ans SUUR, M.D.                                         

 

        2017 [facility]     Neurology 33

[id number] [place]



        08:00:00 08:00:00 t;              NICHOLAS SAAVEDRA ans SUUR, M.D.                                         







Results







           Test Description Test Time  Test Comments Results    Result Comments 

Source









                                        COVID-19 (MOLECULAR TESTING 



                          2021 04:19:19       



                     NUCLEIC ACID AMPLIFICATION)                     









                      Test Item  Value      Reference Range Interpretation Comme

nts









             SARS-CoV-2 NAAT (test code = Not Detected Not Detected             

 



             54502-2)                                            

 

             CYNTHIA (test code = CYNTHIA) [employer] SARS-CoV-2 Assay              

             



                          is a nucleic acid amplification                       

    



                          test intended for the qualitative                     

      



                          detection of RNA from SARS-CoV-2                      

     



                          from nasopharyngeal (NP)                           



                          specimens. ?It is used under                          

 



                          Emergency Use Authorization (EUA)                     

      



                          by FDA. A positive result is                          

 



                          indicative of the presence of                         

  



                          SARS-CoV-2 RNA. ?Clinical                           



                          correlation with patient history                      

     



                          and other diagnostic information                      

     



                          is necessary to determine patient                     

      



                          infection status. A negative (Not                     

      



                          Detected) result does not                           



                          preclude SARS-CoV-2 infection.                        

   



                          ?Clinical correlation with                           



                          patient history and other                           



                          diagnostic information should be                      

     



                          used in patient management                           



                          decisions. Invalid: Unable to                         

  



                          generate a valid test result on                       

    



                          this specimen. ?Please submit a                       

    



                          new specimen for repeat testing                       

    



                          if clinically indicated.                           

 

             Lab Interpretation (test code = Normal                             

    



             87803-4)                                            



[id number]55HISTORY: Cough
for 4 weeks. TECHNIQUE: 2 PA and one lateral views of the chest are obtained. No
priorchest study available for comparison. FINDINGS: Mild generalized 
obstructive lung disease is suspected. Smallcalcified granulomas seen in the 
right upper lung. Lateral view showed 9 mmnodule projected over one of the 
middle thoracic vertebral bodies. Minimalfibrosis noted in the lingula segment. 
No acute pneumonia detected. No pneumothorax or pleural effusion orpulmonary 
congestion. Cardiothoracic ratio of approximately 15.8/36.8 cm isconsistent with
normal cardiac size. Probable small hiatal hernia. CONCLUSIONS:1. No signs of 
acute cardiopulmonary disease.2. 9 mm soft tissue nodule is seen visibleonly in 
the lateral view,projected over one of the middle thoracic vertebral bodies. 
Exact etiologyis uncertain, therefore, noncontrast enhanced CT scan of the 
chestrequested.[facility], [facility] Results InftUser - 2021 12:43 PM 
CDTFormatting of this note might be different from the original.HISTORY: Cough 
for 4 weeks.TECHNIQUE: 2 PA and one lateral views of the chest are obtained. No 
priorchest study available for comparison.FINDINGS: Mild generalized obstructive
lung disease is suspected. Smallcalcified granulomas seen in the right upper 
lung. Lateral view showed 9 mmnodule projected over one of the middle thoracic 
vertebral bodies. Minimalfibrosis noted in the lingula segment.No acute 
pneumoniadetected. No pneumothorax or pleural effusion orpulmonary congestion. 
Cardiothoracic ratio of approximately 15.8/36.8 cm isconsistent with normal 
cardiac size. Probable small hiatal hernia.CONCLUSIONS:1. No signs of acute 
cardiopulmonary disease.2. 9 mm soft tissue nodule is seen visible only in the 
lateral view,projected over one of the middle thoracic vertebral bodies. Exact 
etiologyis uncertain, therefore, noncontrast enhanced CT scan of the 
chestrequested.[facility][U] XRAY THORACOLUMBAR SPINE, 
SCOLIOSIS STUDY (W/ SUPINE AND ERECT) [id number] 11:25:00Images acquired, 
not reported on this accession number.[place] PhysiciansMRI Spine lumbar wo contrast 
[id number] 10:47:00Exam: MRI lumbar spine without contrast.INDICATION: 
Radiculopathy. Left leg muscle atrophy, left foot numbness,bilateral leg 
cramps.COMPARISON: None.TECHNIQUE: Multiecho multiplanar MR sequences of the
lumbar spine are obtainedwithout gadolinium.Discussion: Lowest fully formed disc
 will be designated as L5-S1.Rotatory leftward curvature of the lumbar spine is 
present.T12-L1: Annular bulge without canal stenosis or nerve root 
compression.Right-sided foraminal stenosis encroaching upon the exiting nerve 
root.L1-L2: Right foraminal disc protrusion combined with advanced right-sided 
facetarthropathy causes severe right foraminal stenosis compressing the 
exitingnerve root. No canal stenosis. Mild left foraminal stenosis.L2-L3: 
Annular bulge without canal stenosis. Right lateral recess stenosisencroaching 
upon the descending right L3 nerve root. Bilateral facetarthropathy. Bilateral 
foraminal stenosisencroaching upon the exiting rightnerve roots.L3-L4: Disc is 
collapsed. Annular bulge and advanced facet arthropathy causesevere canal 
stenosis compressing the cauda equina. Bilateral foraminalstenosiswith 
compression of the exiting right L3 nerve root. Grade 1anterolisthesis of L3 on 
L4.L4-L5: Disc is decreased in height. Grade 1 anterolisthesis of L4 on 
L5.Annular bulge and bilateral facet arthropathy encroaching upon the 
descendingL5 nerve roots in the lateral recesses. No canal stenosis. Bilateral 
foraminalstenosis, severe on the left with compression of the exiting left L4 
nerveroot.L5-S1: Disc is decreased in height. Annular bulge and bilateral 
facetarthropathy encroach upon the descending S1 nerve roots in the 
lateralrecesses. No canal stenosis. Bilateral foraminal stenosis, severe on the
leftcompressing the exiting left L5 nerve root.IMPRESSION:Rotatory leftward 
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 
at L3-L4 with grade 1 degenerative anterolisthesis of L3on L4 and canal stenosis
 causing chronic compression of the cauda equina atthis level.Multilevel facet 
arthropathy and foraminal stenosis with neural impingement.--Read by: Eliane Wright MDDictated Date/time: 18 14:54Electronically Signed by: Eliane Wright MD 1815:03FINAL REPORTUT PhysiciansTobacco Use Screening
2017 14:00:00





             Test Item    Value        Reference Range Interpretation Comments

 

             Completed (test code = Completed) DONE                             

      



[facility]

## 2023-01-05 NOTE — EDPHYS
Physician Documentation                                                                           

 [facility]                                                                 

Name: Jesus Muller                                                                             

Age: 72 yrs                                                                                       

Sex: Male                                                                                         

: 1950                                                                                   

MRN: [id number]                                                                                   

Arrival Date: 2023                                                                          

Time: 14:26                                                                                       

Account#: [id number]                                                                            

Bed 20                                                                                            

Private MD:                                                                                       

ED Physician Greg Menchaca                                                                         

HPI:                                                                                              

                                                                                             

14:59 This 72 yrs old Male presents to ER via Unassigned with complaints of Dizziness.        snw 

14:59 The patient presents with dizziness, feeling faint, generalized weakness. Onset: The    snw 

      symptoms/episode began/occurred suddenly, just prior to arrival. Context: occurred          

      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 Modifying factors:     

      The symptoms are alleviated by nothing, the symptoms are aggravated by standing up.         

      Associated signs and symptoms: Pertinent positives: diaphoresis, nausea. Severity of        

      symptoms: At their worst the symptoms were moderate severe in the emergency department      

      the symptoms have improved mildly. two other episode in the past month, both other          

      times were upon entering [facility]. sees Dr. Crouch.                                             

                                                                                                  

Historical:                                                                                       

- Allergies:                                                                                      

15:02 PENICILLINS;                                                                            db  

- PMHx:                                                                                           

15:02 Hypertensive disorder;                                                                  db  

                                                                                                  

- Immunization history:: Adult Immunizations up to date, Client reports receiving the             

  2nd dose of the Covid vaccine.                                                                  

- Social history:: Smoking status: unknown.                                                       

                                                                                                  

                                                                                                  

ROS:                                                                                              

14:56 ENT: Negative for injury, pain, and discharge, Neck: Negative for injury, pain, and     snw 

      swelling, Cardiovascular: Negative for chest pain, palpitations, and edema, Abdomen/GI:     

      Negative for abdominal pain, vomiting, diarrhea, and constipation, +nausea                  

14:56 Back: Negative for injury and pain, : Negative for injury, bleeding, discharge, and       

      swelling, MS/Extremity: Negative for injury and deformity.                                  

14:56 Constitutional: Positive for fatigue, malaise.                                              

14:56 Eyes: Positive for alternating focus left to right, nystagmus?.                             

14:56 Respiratory: Positive for shortness of breath.                                              

14:56 Skin: Positive for diaphoresis.                                                             

14:56 Neuro: Positive for dizziness, near syncope, visual changes, pt states this is the          

      third episode in about 2 months.                                                            

                                                                                                  

Exam:                                                                                             

14:53 Head/Face:  Normocephalic, atraumatic. Eyes:  Pupils equal round and reactive to light, snw 

      extra-ocular motions intact.  Lids and lashes normal.  Conjunctiva and sclera are           

      non-icteric and not injected.  Cornea within normal limits.  Periorbital areas with no      

      swelling, redness, or edema. ENT:  Nares patent. No nasal discharge, no septal              

      abnormalities noted.  Tympanic membranes are normal and external auditory canals are        

      clear.  Oropharynx with no redness, swelling, or masses, exudates, or evidence of           

      obstruction, uvula midline.  Mucous membranes moist. Neck:  Trachea midline, no             

      thyromegaly or masses palpated, and no cervical lymphadenopathy.  Supple, full range of     

      motion without nuchal rigidity, or vertebral point tenderness.  No Meningismus.             

      Chest/axilla:  Normal chest wall appearance and motion.  Nontender with no deformity.       

      No lesions are appreciated. Cardiovascular:  Regular rate and rhythm with a normal S1       

      and S2.  No gallops, murmurs, or rubs.  Normal PMI, no JVD.  No pulse deficits.             

      Respiratory:  Lungs have equal breath sounds bilaterally, clear to auscultation and         

      percussion.  No rales, rhonchi or wheezes noted.  No increased work of breathing, no        

      retractions or nasal flaring. Abdomen/GI:  Soft, non-tender, with normal bowel sounds.      

      No distension or tympany.  No guarding or rebound.  No evidence of tenderness               

      throughout. Back:  No spinal tenderness.  No costovertebral tenderness.  Full range of      

      motion. MS/ Extremity:  Pulses equal, no cyanosis.  Neurovascular intact.  Full, normal     

      range of motion.                                                                            

14:53 Constitutional: The patient appears alert, anxious, diaphoretic, pale, uncomfortable.       

14:53 Skin: Appearance: Moisture: diaphoretic.                                                    

14:53 Neuro: Orientation: is normal, Mentation: is normal, Sensation: is normal, Abnormal         

      movements: there are no abnormal movements.                                                 

                                                                                                  

Vital Signs:                                                                                      

14:35  / 84; Pulse 76; Resp 16; Temp 98.6; Pulse Ox 100% ; Weight 96.16 kg; Height 5    db  

      ft. 10 in. (177.80 cm); Pain 0/10;                                                          

15:00  / 74; Pulse 76; Resp 18; Pulse Ox 100% on R/A;                                   db  

17:00  / 85; Pulse 69; Resp 18; Pulse Ox 97% on R/A;                                    db  

18:20  / 86 Supine; Pulse 70;                                                           db  

18:22  / 86; Pulse 73;                                                                  db  

18:24  / 89 Standing; Pulse 76;                                                         db  

19:20  / 83; Pulse 69; Resp 19; Pulse Ox 99% ; Pain 0/10;                               j7 

20:30  / 92; Pulse 68; Resp 16; Pulse Ox 99% ; Pain 0/10;                               jj7 

21:32  / 74; Pulse 75; Resp 18; Pulse Ox 99% ; Pain 0/10;                               7 

22:29  / 75; Pulse 71; Resp 20; Pulse Ox 97% ;                                          j7 

23:30  / 68; Pulse 68; Resp 16; Pulse Ox 100% ; Pain 0/10;                              7 

                                                                                             

00:43  / 74; Pulse 68; Resp 17; Pulse Ox 99% ;                                          7 

                                                                                             

14:35 Body Mass Index 30.42 (96.16 kg, 177.80 cm)                                             db  

                                                                                                  

NIH Stroke Scale Scores:                                                                          

                                                                                             

14:53 NIHSS Score: 0                                                                          snw 

                                                                                                  

MDM:                                                                                              

14:26 Patient medically screened.                                                             snw 

14:59 Differential diagnosis: cardiac arrhythmia, GI bleed, hypovolemia, near-syncope, TIA,   snw 

      vertigo. Data reviewed: vital signs, nurses notes. Data interpreted: Pulse oximetry: on     

      room air is 100 %. Interpretation: normal.                                                  

                                                                                                  

                                                                                             

14:27 Order name: Basic Metabolic Panel; Complete Time: 16:20                                 snw 

                                                                                             

14:27 Order name: CBC with Diff; Complete Time: 16:01                                         snw 

                                                                                             

14:27 Order name: LFT's; Complete Time: 16:20                                                 snw 

                                                                                             

14:27 Order name: Magnesium; Complete Time: 16:20                                             snw 

                                                                                             

14:27 Order name: NT PRO-BNP; Complete Time: 16:20                                            snw 

                                                                                             

14:27 Order name: PT-INR; Complete Time: 16:01                                                snw 

                                                                                             

14:27 Order name: Troponin HS; Complete Time: 16:20                                           snw 

                                                                                             

14:27 Order name: XRAY Chest (1 view); Complete Time: 15:14                                   snw 

                                                                                             

14:28 Order name: Phosphorus; Complete Time: 16:20                                            snw 

                                                                                             

14:28 Order name: TSH; Complete Time: 16:20                                                   snw 

                                                                                             

17:08 Order name: Urine Culture                                                               snw 

                                                                                             

17:08 Order name: Urine Microscopic Only; Complete Time: 17:39                                snw 

                                                                                             

17:17 Order name: Urine Dipstick-Ancillary; Complete Time: 17:21                              EDMS

                                                                                             

19:28 Order name: SARS RAPID; Complete Time: 11:19                                            bb  

                                                                                             

14:27 Order name: EKG; Complete Time: 14:28                                                   snw 

                                                                                             

14:28 Order name: Cardiac monitoring; Complete Time: 16:26                                    snw 

                                                                                             

14:28 Order name: EKG - Nurse/Tech; Complete Time: 16:26                                      snw 

                                                                                             

14:28 Order name: IV Saline Lock; Complete Time: 15:31                                        snw 

                                                                                             

14:28 Order name: Labs collected and sent; Complete Time: 15:32                               snw 

                                                                                             

14:28 Order name: O2 Per Protocol; Complete Time: 16:26                                       snw 

                                                                                             

14:28 Order name: O2 Sat Monitoring; Complete Time: 16:26                                     snw 

                                                                                             

14:28 Order name: CT Head Brain wo Cont; Complete Time: 15:05                                 snw 

                                                                                             

14:53 Order name: US Carotid Artery Bilateral; Complete Time: 16:20                           snw 

                                                                                             

17:08 Order name: Urine Dipstick-Ancillary (obtain specimen); Complete Time: 17:19            snw 

                                                                                             

17:08 Order name: Orthostatics; Complete Time: 18:26                                          snw 

                                                                                                  

ECG:                                                                                              

15:39 Rate is 70 beats/min. Rhythm is regular. T waves are Normal. Clinical impression: NSR   snw 

      w/ Non-specific ST/T Changes.                                                               

                                                                                                  

Administered Medications:                                                                         

16:22 CANCELLED (not available): Phenergan (promethazine) 12.5 mg IM once                     snw 

16:26 Drug: Aspirin Chewable Tablet 324 mg Route: PO;                                         db  

17:58 Drug: Potassium \T\ Sodium Phosphates Packet 280 mg-160 mg-250 mg 1 packets Route: PO;    db

18:26 Follow up: Response: No adverse reaction                                                db  

                                                                                                  

                                                                                                  

Disposition:                                                                                      

16:50 Co-signature as Attending Physician, Greg HILLIARD was immediately available on-site ms3 

      in the Emergency Department for consultation in the care of the patient.                    

                                                                                                  

Disposition Summary:                                                                              

23 17:47                                                                                    

Hospitalization Ordered                                                                           

      Hospitalization Status: Observation                                                     snw 

      Provider: Crouch, Elie                                                                  snw 

      Location: Telemetry/MedSurg (observation)                                               snw 

      Condition: Stable                                                                       snw 

      Problem: an acute exacerbation                                                          snw 

      Symptoms: have improved                                                                 snw 

      Bed/Room Type: Standard                                                                 snw 

      Room Assignment: 413(23 22:56)                                                    cg  

      Diagnosis                                                                                   

        - Syncope Near                                                                        snw 

      Forms:                                                                                      

        - Medication Reconciliation Form                                                      snw 

        - SBAR form                                                                           snw 

                                                                                                  

NIH Stroke Scale - NIH Stroke Score                                                               

Date: 2023                                                                                  

Time: 14:53                                                                                       

Total Score = 0                                                                                   

  1a. Level of Consciousness (LOC) - 0(Alert)                                                     

  1b. Level of Consciousness (LOC) (Month \T\ Age) - 0(Both)                                      

  1c. LOC Commands (Open \T\ Closes Eyes/) - 0(Both)                                          

   2. Best Gaze (Lateral Gaze Paresis) - 0(Normal)                                                

   3. Visual Field Loss - 0(No visual loss)                                                       

   4. Facial Palsy - 0(Normal)                                                                    

  5a. Left Arm: Motor (10-second hold) - 0(No drift)                                              

  5b. Right Arm: Motor (10-second hold) - 0(No drift)                                             

  6a. Left Leg: Motor (5-second hold - always test supine) - 0(No drift)                          

  6b. Right Leg: Motor (5-second hold - always test supine) - 0(No drift)                         

   7. Limb Ataxia (finger/nose \T\ heel/shin - test with eyes open) - 0(Absent)                   

   8. Sensory Loss (pinprick arms/legs/face) - 0(Normal)                                          

   9. Best Language: Aphasia (description/naming/reading) - 0(No aphasia)                         

  10. Dysarthria (speech clarity - read or repeat words) - 0(Normal)                              

  11. Extinction and Inattention (visual/tactile/auditory/spatial/personal) - 0(No                

      abnormality)                                                                                

Initials: snw                                                                                     

                                                                                                  

Signatures:                                                                                       

Dispatcher [employer]                           EDMS                                                 

Joann Garcia, FNP-C                   FNP-Csnw                                                  

Ani Fong, RN                       RN   cg                                                   

Greg Menchaca DO                        DO   ms3                                                  

Yamilet Camp RN                    RN   db                                                   

                                                                                                  

Corrections: (The following items were deleted from the chart)                                    

16:22 14:28 Phenergan (promethazine) 12.5 mg IM once ordered. snw                     snw         

22:56 17:47 snw                                                                       cg          

                                                                                                  

**************************************************************************************************

## 2023-01-06 LAB
BUN BLD-MCNC: 15 MG/DL (ref 7–18)
GLUCOSE SERPLBLD-MCNC: 112 MG/DL (ref 74–106)
HCT VFR BLD CALC: 42 % (ref 39.6–49)
LYMPHOCYTES # SPEC AUTO: 1.7 K/UL (ref 0.7–4.9)
MCV RBC: 91.1 FL (ref 80–100)
PMV BLD: 7.3 FL (ref 7.6–11.3)
POTASSIUM SERPL-SCNC: 3.6 MMOL/L (ref 3.5–5.1)
RBC # BLD: 4.61 M/UL (ref 4.33–5.43)
TROPONIN I SERPL HS-MCNC: 6.1 PG/ML (ref ?–58.9)

## 2023-01-06 RX ADMIN — PANTOPRAZOLE SODIUM SCH: 40 TABLET, DELAYED RELEASE ORAL at 05:09

## 2023-01-06 RX ADMIN — TAMSULOSIN HYDROCHLORIDE SCH MG: 0.4 CAPSULE ORAL at 10:01

## 2023-01-06 RX ADMIN — Medication SCH ML: at 21:00

## 2023-01-06 RX ADMIN — Medication SCH ML: at 09:00

## 2023-01-06 RX ADMIN — Medication SCH ML: at 05:11

## 2023-01-06 RX ADMIN — CITALOPRAM HYDROBROMIDE SCH MG: 10 TABLET ORAL at 10:01

## 2023-01-06 NOTE — CON
Reason For Consultation:  Consultation called because of dizziness.



History Of Present Illness:  Mr. Muller is a 72-year-old right-handed  patient with hype
rtension who comes to [facility] with history of 3 similar episodes of vertigo with nausea, 
generalized weakness, and feeling faint.  The most recent episode brought him into the hospital.  He 
said it occurred on the day of this admission and that was the longest.  He actually was loading some
 items onto his truck.  He was bending and lifting up and putting items in the truck when after stand
ing up, he felt as though the world was spinning and moving around.  He felt nauseated, had generaliz
ed weakness, and had to hold on to prevent from falling.  He also had a burning sensation across his 
chest.  He denies any focal findings such as face, arm, or leg weakness or numbness or seizure-like a
ctivity, which may be shaking, tongue biting, or loss of bowel and bladder control.  He came to [facility] and had an evaluation including head CT scan at 02:28.  That CT scan showed no acute i
schemic or hemorrhagic findings and his arrival time in the emergency room was 226.  He was not consi
dered to be a patient having a stroke and his NIH Stroke Scale was 0.  He was evaluated for myocardia
l infarction and admitted for cardiac workup.  His complete blood count with differential was unremar
kable.  INR 1.1.  Chemistries unremarkable except for mild dehydration with creatinine 1.31 and after
 hydration today is 0.93.  Liver function studies were normal.  Thyroid function panel was normal.  C
alcium and magnesium unremarkable.  Urinalysis is unremarkable.  COVID testing was negative.  Chest x
-ray revealed no acute cardiopulmonary processes.  A carotid artery ultrasound showed moderate hard p
laque in the right carotid bulb without hemodynamically significant stenosis.  A cardiac imaging nucl
ear medicine stress test revealed no stress-induced ischemia.  There was a moderately large inferior 
wall fixed perfusion abnormality from base to apex, which could be scarring, diaphragmatic attenuatio
n, artifact, or a combination.  Ejection fraction was around 65%.



Past Medical History:  Hypertension.



Allergies:  PENICILLIN.



Family History:  Noncontributory.



Social History:  Occasional alcohol.  No tobacco use.



Review of Systems:

As noted, the patient has had a total of 3 similar episodes in 2 months.  At 1 episode he was in the 
grocery store and came out, was unsteady on his feet, held on to the grocery cart for a while and as 
he got out to his truck, he looked over at the car that was about 80 feet away and the car was moving
 back and forth in his vision.  He was eventually able to drive home after that event subsided about 
30 seconds later.



Physical Examination:

Vital Signs:  Blood pressure 145/91, pulse 68, respiratory rate 16, temperature 97.6, and oxygen satu
ration 100% on room air.  Weight 212 pounds, height 5 feet 9 inches 0.5 inches BMI 30.9. 

General:  Mr. Muller is resting comfortably.  He is in no significant distress.  

HEENT:  He is normocephalic, atraumatic.  His sclerae are anicteric.  Oropharynx pink and moist. 

Neck:  Supple. 

Chest:  Clear. 

Heart:  Regular. 

Extremities:  No clubbing, cyanosis, or edema.  

Neurologic:  He has no cranial nerves or no focal motor, coordination, sensory, or gait deficits.  Re
flexes are 1 to 2+ and symmetric.  Gait:  He has good stance and stride.



Assessment:  Mr. Muller is a 72-year-old patient with repeated vertiginous episodes and hypertens
ion.  His head CT scan shows no significant abnormalities.  Vascular evaluation is not significant at
 this point.



Plan:  

1.He should perform the Epley maneuver as is appropriate.

2.Continue with lisinopril for hypertension.  

3.He also has Flomax for prostatic hypertrophy.

4.He should be on folic acid 1 mg daily.

5.After discharge, follow up in Dr. Herrera's clinic within the month.





DAFNE/KADI

DD:  01/06/2023 14:51:57Voice ID:  [id number]

DT:  01/06/2023 19:38:16Report ID:  [id number]

## 2023-01-06 NOTE — RAD REPORT
EXAM DESCRIPTION:  NM - Rest Stress Cardiac Imaging - 1/6/2023 2:12 pm

 

CLINICAL HISTORY:  Chest pain

 

COMPARISON:  Portable chest 01/05/2023

 

TECHNIQUE:  The patient was administered 10.7 mCi of Tc 99m Sestamibi prior to resting SPECT imaging 
of the heart. The patient was then administered 29.6 mCi of Tc 99m Sestamibi following exercise or ph
armacologic stress. Multiplanar SPECT images were reviewed.

 

FINDINGS:  The end diastolic volume is 134 ml, the end systolic volume is 47 ml, and the ejection fra
ction is 65 %.

 

Moderately large area of fixed perfusion abnormality is seen in the inferior wall from base to apex. 
No clearly ischemic component seen. This could be scarring, attenuation artifact or a combination. No
 stress-induced  ischemic changes seen.

 

IMPRESSION:  No stress-induced ischemic changes confirmed on this study.

 

Moderately large inferior wall fixed perfusion abnormality from base to apex could be scarring, diaph
ragmatic attenuation artifact or a combination.

 

End-diastolic volume is enlarged slightly at 134 mL. Ejection fraction is well within normal limits a
t 65%.

## 2023-01-06 NOTE — RAD REPORT
EXAM DESCRIPTION:  MRI - MRA Neck W/Wo Cont - 1/6/2023 5:23 pm

 

CLINICAL HISTORY:

CVA, syncope

 

COMPARISON:  MRI brain same date, CT head 01/05/2023

 

TECHNIQUE:  MR angiography of the cervical vasculature performed. Coronal imaging plane acquisition u
tilized. A 20 MultiHance contrast volume was utilized. Coronal reformatted images were generated and 
reviewed. Vertical axis 3D rotational projections obtained using maximum intensity projection protoco
l.

 

FINDINGS:  No stenosis or focal abnormality at the great vessel origins. Bilateral subclavian arterie
s are unremarkable. No stenosis seen at the origin of the codominant vertebral arteries. No significa
nt or suspicious vertebral finding. Vertebrobasilar vasculature is tortuous.

 

Bilateral common carotid arteries show mild tortuosity but no focal abnormalities. The left bulb and 
left ICA show no suspicious findings. Prominent plaquing changes are present at the right carotid bul
b creating a approximately 50% stenosis. More distally the internal carotid artery shows prominent to
rtuosity but no focal stenosis. No dissection changes are present.

 

IMPRESSION:  Irregular plaquing changes in the right carotid bulb with approximately 50% stenosis.

 

Additional nonacute or non clinically significant findings detailed in the body of the report.

## 2023-01-06 NOTE — RAD REPORT
EXAM DESCRIPTION:  MRI - MRA Head Wo Cont - 1/6/2023 5:23 pm

 

CLINICAL HISTORY:  Syncope, CVA

 

COMPARISON:  MRI brain same date

 

TECHNIQUE:  MR angiography of the cervical vasculature performed. Coronal imaging plane acquisition u
tilized. A  MultiHance contrast volume was utilized. Coronal reformatted images were generated and re
viewed. Vertical axis 3D rotational projections obtained using maximum intensity projection protocol.


 

FINDINGS:  No aneurysm or vascular malformation identified. No named branch occlusion, dissection, or
 vasculitis. Right posterior communicating artery is present with very small or absent right PCA P1 s
egment. Right posterior cerebral artery is small and not well visualized relative to the left posteri
or cerebral artery. This may be due to the more diminished flow through the posterior communicating a
rtery. There are no acute or chronic manifestations of CVA in the right PCA distribution.

 

Vertebrobasilar tortuosity is present without focal abnormality. No basilar artery significant findin
g.

 

IMPRESSION:  Right posterior cerebral artery is very small in size along its entire course with its e
ntire or primary supply via the right posterior communicating artery.

 

This is a normal anatomic variation. The diminished appearance to the right PCA may be technical. The
re are no manifestations of right PCA distribution acute or chronic ischemia.

 

Remainder the examination is without significant finding.

## 2023-01-06 NOTE — CON
Date of Consultation:  01/06/2023



Reason For Consultation:  Chest pain and dizziness.



History Of Present Illness:  A 72-year-old male who has history of dizziness, nausea, and episodes of
 chest pain, presented to the emergency room.  No further chest pain is present.  Patient felt that t
hings are spinning around and feels also inability to focus, but denies having any neurological focal
 deficits.  There is no exertional chest pain.  No shortness of breath.



Past Medical History:  Hypertension.



Medications:  Refer to reconciliation sheet for detailed list.



Allergies:  PENICILLIN.



Family History:  No premature coronary artery disease or cancer.



Social History:  He does not smoke or drink.  Does not use any drugs.



Review of Systems:

All systems reviewed are negative except mentioned in HPI.



Physical Examination:

Vital signs:  Reviewed. 

Head and Neck:  Pupils are equal, reactive to light.  Intact eye movements.  No JVD.  No cyanosis. 

Neck:  Supple.  Thyroid is not enlarged. 

Lungs:  Clear to auscultation bilaterally.  No rhonchi, wheezing, or crackles.  No accessory muscle u
se. 

Heart:  Regular rate and rhythm.  No extra sounds. 

Abdomen:  Soft, nontender.  Bowel sounds positive.  No organomegaly.  No masses or hernia.  No rigidi
ty or rebound. 

Extremities:  No clubbing, cyanosis.  Intact pulses. 

Skin:  No rash. 

Neurologic:  Alert, awake, oriented x3.  No acute focal deficits appreciated.



Investigations:  BUN is 15, creatinine 0.93, and hemoglobin is 14.6.



Assessment And Recommendations:  

1.Chest pain.  It is atypical.  Cardiac enzymes have been negative and stress test is negative.  Thi
s is not cardiac in etiology.  Await on the echocardiogram.

2.Hypertension.  Blood pressure is slightly elevated on lisinopril.  Re-assess in the morning.  Adju
st medications if needed.

3.Dizziness.  Worrisome for possible cerebellar cerebrovascular accident.  An MRI is recommended.





SR/MODL

DD:  01/06/2023 14:50:19Voice ID:  [id number]

DT:  01/06/2023 19:53:39Report ID:  [id number]

## 2023-01-06 NOTE — RAD REPORT
EXAM DESCRIPTION:  MRI - Brain W/Wo Cont - 1/6/2023 5:23 pm

 

CLINICAL HISTORY:  syncope

 

COMPARISON:  Head Brain Wo Cont dated 1/5/2023

 

TECHNIQUE:  Sagittal and axial T1-weighted images were obtained. Axial PD/heavily T2-weighted and T2-
FLAIR images were obtained along with axial DWI/ADC mapping sequences.  Coronal heavily T2 weighted s
equence obtained.  Axial and coronal post-contrast T1-weighted images were also obtained. A 20 ml Mul
tihance contrast following utilized.

 

FINDINGS:  No intracranial hemorrhage, mass or acute infarction.  There is no edema or shift of midli
ne structures. No extra-axial fluid collections. Gray-matter/white matter junction is preserved.  Sig
nal voids are seen as a normal finding in the major intracranial vessels. No significant atrophy or c
hronic ischemic change seen. Ventricles are normal in size.

 

Post-contrast images show normal enhancement. No dural thickening.

 

Mastoid air cells and paranasal sinuses are clear.

 

No globe or orbital content suspicious finding.

 

IMPRESSION:  No acute or subacute infarction changes. No acute intracranial process identifiable.

## 2023-01-06 NOTE — HP
Date of Admission:  01/06/2023



Chief Complaint:  Nausea, dizziness, sweating, and chest pain.



History Of Present Illness:  This is a 72-year-old male patient who had 3 episodes of nausea, dizzine
ss and sweating in last 1 month and today's episode was worst episode and he came into emergency room
.  After he was evaluated, he was admitted to the hospital.  The patient reports that today he was lo
ading and unloading some heavy loads in the back of his truck and after working for about 10 to 15 mi
nutes, all of a sudden he started to feel dizzy, felt like he was getting nauseated and he was sweati
ng a lot.  Subsequently, he started to have some burning type of chest pain right across the chest.  
With all these complaints, he was brought into emergency room and after he was evaluated, he was admi
tted to the hospital.  He had 2 prior episodes of similar nature.  Denies any headache.  No blurred v
ision or double vision.  No loss of eyesight.  After the patient was evaluated in the emergency room,
 he was admitted to the hospital.



Physical Examination:

Vital Signs:  Temperature __________, pulse __________, respiratory rate __________, blood pressure _
_________, oxygen saturation __________, height __________, and weight __________. 

General:  Awake, alert, oriented, not in distress. 

HEENT:  Head atraumatic, normocephalic.  Conjunctivae nonerythematous.  Sclerae white.  Mouth, no thr
ush or edema noted.  Ears/Nose, no mass, lesion, discharge noted. 

Neck:  Supple. No JVD, lymph nodes, bruit, thyromegaly noted. 

Lungs:  Bilateral good equal air entry. Clear to auscultation. No rhonchi.  No rales. 

Heart:  Normal heart sounds, no murmur or gallop. 

Abdomen:  Soft, bowel sounds normal. No guarding, rigidity, tenderness, mass, hepatosplenomegaly, dis
tention, or bruit noted. 

Extremities:  No leg edema.  No calf tenderness. 

Skin:  No rash, ulcer, cellulitis. 

Lymphatics:  No lymph node enlargement in neck, supraclavicular, infraclavicular region. 

Neuro:  No focal neurological deficit. 

Chest:  Unremarkable. 

External Genitalia:  Deferred. 

Rectal:  Deferred.



Laboratory Data:  White count 11.9, hemoglobin 15.3, and platelets 211.  Sodium 139, potassium 3.9, c
hloride 109, bicarb 25, BUN 15, creatinine 1.31, and glucose 114.  Liver function tests unremarkable.
  Phosphorus low at 2.  Troponin level 6.1.  TSH 0.628.  CAT scan of the head was negative for any ac
s[place]eta intracranial changes.  Chest x-ray with no acute cardiopulmonary changes noted.  Carotid Doppler 
shows plaquing of the carotid revealing approximately 50% stenosis of the right carotid.  EKG with no
 acute changes.



Impression:  

1.Chest pain.

2.Dizziness.

3.Hypertension.

4.Hyperlipidemia.



Plan:  We will go ahead and admit the patient to hospital for further evaluation and management of th
is problem.  We will get serial cardiac enzymes.  Consult Cardiology.  Consult Neurology.  Continue h
ome medications per order.  We will get echo with Doppler tomorrow and Lexiscan stress test tomorrow 
and plan of treatment discussed with the patient.  DVT prophylaxis will be given per order.  I will s
ee him tomorrow morning for followup.





BIJU/MODL

DD:  01/05/2023 20:27:56Voice ID:  [id number]

## 2023-01-07 VITALS — TEMPERATURE: 97.2 F | SYSTOLIC BLOOD PRESSURE: 147 MMHG | DIASTOLIC BLOOD PRESSURE: 84 MMHG

## 2023-01-07 VITALS — OXYGEN SATURATION: 98 %

## 2023-01-07 RX ADMIN — CITALOPRAM HYDROBROMIDE SCH MG: 10 TABLET ORAL at 09:37

## 2023-01-07 RX ADMIN — Medication SCH: at 09:00

## 2023-01-07 RX ADMIN — TAMSULOSIN HYDROCHLORIDE SCH MG: 0.4 CAPSULE ORAL at 09:37

## 2023-01-07 RX ADMIN — PANTOPRAZOLE SODIUM SCH MG: 40 TABLET, DELAYED RELEASE ORAL at 06:35

## 2023-01-07 NOTE — PN
Date of Progress Note:  01/06/2023



Subjective:  The patient was seen this morning for followup.  Denies any headache, nausea, vomiting. 
 No chest pain.



Objective:  Vital Signs:  Reviewed. 

HEENT:  Unremarkable. 

Lungs:  Clear to auscultation. 

Heart:  Sounds normal. 

Abdomen:  Soft.  Bowel sounds normal.  No guarding, rigidity, tenderness, distention. 

Extremities:  No leg edema. 

Neuro:  No focal neurological deficits.



Laboratory Data:  White count 12.3, hemoglobin 14.6, platelets 207.  Sodium 139, potassium 3.6, chlor
rebel 106, bicarb 27, BUN 15, creatinine 0.93 glucose 112.



Impression:  

1.Chest pain.

2.Dizziness.

3.Hypertension.

4.Hyperlipidemia.



Plan:  We will go ahead and continue current medications.  Follow up with cardiologist and neurologis
t, consultation was requested and we will follow up with these 2 physicians and see what their recomm
endation is.  The patient will have MRI of brain done today.  We will also have stress test and echoc
ardiogram.  Details of plan of treatment discussed with him.





BIJU/MODL

DD:  01/07/2023 08:59:31Voice ID:  [id number]

DT:  01/07/2023 09:35:12Report ID:  [id number]

## 2023-01-07 NOTE — EKG
Test Date:    2023-01-05               Test Time:    14:42:33

Technician:   BCW                                    

                                                     

MEASUREMENT RESULTS:                                       

Intervals:                                           

Rate:         70                                     

SD:           184                                    

QRSD:         88                                     

QT:           408                                    

QTc:          440                                    

Axis:                                                

P:            48                                     

SD:           184                                    

QRS:          -4                                     

T:            25                                     

                                                     

INTERPRETIVE STATEMENTS:                                       

                                                     

Normal sinus rhythm

Normal ECG

No previous ECG available for comparison



Electronically Signed On 01-07-23 17:40:26 CST by Carlos Palacios

## 2023-01-07 NOTE — DS
Date of Discharge:  01/07/2023



Disposition:  Discharged to go home.



Physical Examination:

HEENT:  Unremarkable. 

Lungs:  Clear to auscultation. 

Heart:  Sounds normal. 

Abdomen:  Soft.  Bowel sounds normal.  No guarding, rigidity, tenderness, or distention. 

Extremities:  No leg edema. 

Neuro:  No focal neurological deficits.



Laboratory Data:  Upon admission:  Sodium 139, potassium 3.9, chloride 109, bicarb 25, BUN 15, creati
nine 1.31, glucose 114.  Liver function tests unremarkable.  Troponin 6.1 on the first set and second
 set.  Yesterday:  Sodium 139, potassium 3.6, chloride 106, bicarb 27, BUN 15, creatinine 0.93, gluco
se 112.  Upon admission:  White count was 11.9, hemoglobin 15.3, platelets 211.  Yesterday, white cou
nt 7.3, hemoglobin 14.6, platelets 207.  Chest x-ray was negative.  CAT scan of the head was negative
 for any acute intracranial changes.  MRA of neck shows irregular plaquing involving right carotid bu
lb with approximately 50% stenosis Lexiscan stress test, no stress-induced ischemia.  MRA of the head
 shows right posterior cerebral artery is very small in size along with its entire or primary supply,
 via right posterior communicating artery and there is no evidence of any ischemia in this distributi
on.  MRI of brain was negative for any acute intracranial changes.  No evidence of stroke.  Carotid D
oppler shows moderate hard plaquing in the right carotid bulb.



Discharge Medications:  Continue all prior home medication.



Discharge Instructions:  

1.Come to my office for fasting blood test this coming week and after blood test is done, he should 
start taking atorvastatin 40 mg daily at bedtime and this prescription will be sent to [facility] and once you start atorvastatin, you should stop lovastatin at that time.

2.Follow up at my office week after next.



Hospital Course:  This is a 72-year-old male patient, who came into emergency room with complaints of
 dizziness, nausea, sweating, and chest pain.  Please see dictated H and P for more information.  Aft
er patient was evaluated in the emergency room, he was admitted to the hospital.  His MI was ruled ou
t.  Cardiology and Neurology consultation were requested and details were discussed with Dr. Herrera
 yesterday and his stroke was ruled out and his presentation of dizziness associated with other sympt
om is likely due to underlying vertigo type of problem.  MI was ruled out and carotid Doppler and MRA
 has shown approximately 50% stenosis of the right carotid artery and findings of all these test resu
lts reviewed with the patient today.  He takes lovastatin at home and I have asked him to discontinue
 that and start atorvastatin, but prior to starting atorvastatin, he will get fasting lipid profile d
one at the office.  He was advised to change position slowly, keep himself well hydrated, and I will 
see him at office for followup in the near future.



Final Diagnoses:  

1.Chest pain.

2.Dizziness.

3.Benign positional vertigo.

4.Hypertension.

5.Hyperlipidemia.

6.Right-sided carotid artery stenosis.





BIJU/MODL

DD:  01/07/2023 09:06:48Voice ID:  [id number]

DT:  01/07/2023 09:36:17Report ID:  [id number]

## 2023-01-09 NOTE — ECHO
HEIGHT: 5 ft 9.5 in   WEIGHT: 212 lb 4 oz   DATE OF STUDY: 01/06/2023   REFER DR: 
Joann Langley FNP-BC

2-DIMENSIONAL: YES

     M.MODE: YES

 DOPPLER: YES

COLOR FLOW: YES



                    TDS:  

PORTABLE: YES

 DEFINITY:  

BUBBLE STUDY: 





DIAGNOSIS:  NEAR SYNCOPE



CARDIAC HISTORY:  

CATHERIZATION: NO

SURGERY: NO

PROSTHETIC VALVE: NO

PACEMAKER: NO





MEASUREMENTS (cm)

    DIASTOLIC (NORMALS)                 SYSTOLIC (NORMALS)

IVSd                 1.1 (0.6-1.2)                    LA Diam 2.6 (1.9-4.0)     LVEF       
  55-60%  

LVIDd               5.1 (3.5-5.7)                        LVIDs      3.8 (2.0-3.5)     %FS  
        27%

LVPWd             1.1 (0.6-1.2)

Ao Diam           3.0 (2.0-3.7)



2 DIMENSIONAL ASSESSMENT:

RIGHT ATRIUM:                   NORMAL

LEFT ATRIUM:       NORMAL



RIGHT VENTRICLE:            NORMAL

LEFT VENTRICLE: NORMAL



TRICUSPID VALVE:             NORMAL

MITRAL VALVE:     MILD MITRAL REGURGITATION



PULMONIC VALVE:             NORMAL

AORTIC VALVE:     MILD AORTIC INSUFFICIENCY



PERICARDIAL EFFUSION: NONE

AORTIC ROOT:      NORMAL





LEFT VENTRICULAR WALL MOTION:     NORMAL



DOPPLER/COLOR FLOW:     SEE BELOW



COMMENTS:      

  1. NORMAL LEFT VENTRICULAR EJECTION FRACTION 55-60%

  2. NORMAL WALL MOTION

  3. MILD MITRAL REGURGITATION

  4. MILD AORTIC INSUFFICIENCY

  5. MILD TRICUSPID REGURGITATION



TECHNOLOGIST:   FITO MCDONALD

## 2023-01-09 NOTE — TREADPHA
DX:  CHEST PAIN



Date of Study: 01/06/2023





Ht: 5' 9.5 "

Wt:  212 lb 4 oz



Consulting Physician:  BOB







MEDICATIONS:  CELEXA, PRINIVIL, TYLENOL, FLOMAX



HISTORY:  72 YEAR OLD MALE WITH COMPLAINTS OF DIZZINESS.  HISTORY OF HYPERTENSION



PHYSICIAL EXAMINATION: 

            



RESTING B.P.:  160/98

RESTING H.R.:  71





RESTING EKG:  NORMAL SINUS RHYTHM, WITHIN NORMAL LIMITS

                    

            

PROTOCOL:  PHARMACOLOGIC

EXERCISE TIME:  3:30 

B.P. AT PEAK STRESS:  153/85





IMPRESSION:  LEXISCAN INJECTED.  CARDIOLITE INJECTED - SEE NUCLEAR MEDICINE REPORT.  NO 
CHEST PAIN REPORTED.  NO SUPRAVENTRICULAR TACHYCARDIA, VENTRICULAR TACHYCARDIA, PREMATURE 
VENTRICULAR COMPLEXES OR PREMATURE ATRIAL COMPLEXES NOTED.  PATIENT STATES FEELING SICK TO 
STOMACH.  NO ELECTROCARDIOGRAM CHANGES WITH LEXISCAN.

## 2023-08-24 ENCOUNTER — HOSPITAL ENCOUNTER (EMERGENCY)
Dept: HOSPITAL 97 - ER | Age: 73
Discharge: HOME | End: 2023-08-24
Payer: COMMERCIAL

## 2023-08-24 VITALS — TEMPERATURE: 97.7 F

## 2023-08-24 VITALS — OXYGEN SATURATION: 98 % | SYSTOLIC BLOOD PRESSURE: 160 MMHG | DIASTOLIC BLOOD PRESSURE: 81 MMHG

## 2023-08-24 DIAGNOSIS — Z20.822: ICD-10-CM

## 2023-08-24 DIAGNOSIS — J44.9: Primary | ICD-10-CM

## 2023-08-24 DIAGNOSIS — Z88.0: ICD-10-CM

## 2023-08-24 DIAGNOSIS — I10: ICD-10-CM

## 2023-08-24 LAB
ALBUMIN SERPL BCP-MCNC: 3.8 G/DL (ref 3.4–5)
ALP SERPL-CCNC: 118 U/L (ref 45–117)
ALT SERPL W P-5'-P-CCNC: 38 U/L (ref 16–61)
AST SERPL W P-5'-P-CCNC: 26 U/L (ref 15–37)
BILIRUB INDIRECT SERPL-MCNC: 1.2 MG/DL (ref 0.2–0.8)
BUN BLD-MCNC: 15 MG/DL (ref 7–18)
GLUCOSE SERPLBLD-MCNC: 119 MG/DL (ref 74–106)
HCT VFR BLD CALC: 43.8 % (ref 39.6–49)
INR BLD: 1.12
LYMPHOCYTES # SPEC AUTO: 1.3 K/UL (ref 0.7–4.9)
MAGNESIUM SERPL-MCNC: 2.1 MG/DL (ref 1.6–2.4)
MCV RBC: 91.8 FL (ref 80–100)
NT-PROBNP SERPL-MCNC: 141 PG/ML (ref ?–125)
PMV BLD: 6.9 FL (ref 7.6–11.3)
POTASSIUM SERPL-SCNC: 3.5 MEQ/L (ref 3.5–5.1)
RBC # BLD: 4.77 M/UL (ref 4.33–5.43)
TROPONIN I SERPL HS-MCNC: 6.1 PG/ML (ref ?–58.9)
WBC # BLD AUTO: 6.5 THOU/UL (ref 4.3–10.9)

## 2023-08-24 PROCEDURE — 71045 X-RAY EXAM CHEST 1 VIEW: CPT

## 2023-08-24 PROCEDURE — 99285 EMERGENCY DEPT VISIT HI MDM: CPT

## 2023-08-24 PROCEDURE — 83880 ASSAY OF NATRIURETIC PEPTIDE: CPT

## 2023-08-24 PROCEDURE — 93005 ELECTROCARDIOGRAM TRACING: CPT

## 2023-08-24 PROCEDURE — 96375 TX/PRO/DX INJ NEW DRUG ADDON: CPT

## 2023-08-24 PROCEDURE — 80076 HEPATIC FUNCTION PANEL: CPT

## 2023-08-24 PROCEDURE — 80048 BASIC METABOLIC PNL TOTAL CA: CPT

## 2023-08-24 PROCEDURE — 84484 ASSAY OF TROPONIN QUANT: CPT

## 2023-08-24 PROCEDURE — 36415 COLL VENOUS BLD VENIPUNCTURE: CPT

## 2023-08-24 PROCEDURE — 83735 ASSAY OF MAGNESIUM: CPT

## 2023-08-24 PROCEDURE — 96361 HYDRATE IV INFUSION ADD-ON: CPT

## 2023-08-24 PROCEDURE — 85610 PROTHROMBIN TIME: CPT

## 2023-08-24 PROCEDURE — 93306 TTE W/DOPPLER COMPLETE: CPT

## 2023-08-24 PROCEDURE — 85025 COMPLETE CBC W/AUTO DIFF WBC: CPT

## 2023-08-24 PROCEDURE — 96374 THER/PROPH/DIAG INJ IV PUSH: CPT

## 2023-08-24 PROCEDURE — 71275 CT ANGIOGRAPHY CHEST: CPT

## 2023-08-24 PROCEDURE — 87811 SARS-COV-2 COVID19 W/OPTIC: CPT

## 2023-08-24 PROCEDURE — 87804 INFLUENZA ASSAY W/OPTIC: CPT

## 2023-08-24 NOTE — RAD REPORT
EXAM DESCRIPTION:  RAD - Chest Single View - 8/24/2023 11:59 am

 

CLINICAL HISTORY:  COPD

Chest pain.

 

COMPARISON:  Chest Single View dated 1/5/2023

 

FINDINGS:  Portable technique limits examination quality.

 

Mild interstitial pulmonary edema. The heart is moderately enlarged in size. No displaced fractures.

 

IMPRESSION:  Mild CHF.

## 2023-08-24 NOTE — ER
Nurse's Notes                                                                                     

 [facility]                                                                 

Name: Jesus Muller                                                                             

Age: 73 yrs                                                                                       

Sex: Male                                                                                         

: 1950                                                                                   

MRN: [id number]                                                                                   

Arrival Date: 2023                                                                          

Time: 10:54                                                                                       

Account#: [id number]                                                                            

Bed 5                                                                                             

Private MD:                                                                                       

Diagnosis: Dyspnea;COPD/ Chronic obstructive pulmonary disease, unspecified;Essential (primary)   

  hypertension                                                                                    

                                                                                                  

Presentation:                                                                                     

                                                                                             

10:57 Coronavirus screen: Vaccine status: Patient reports receiving the 2nd dose of the covid bp  

      vaccine. Client denies travel out of the [place] in the last 14 days. At this time, the        

      client does not indicate any symptoms associated with coronavirus-19. Ebola Screen:         

      Patient denies travel to an Ebola-affected area in the 21 days before illness onset.        

      Initial Sepsis Screen: Does the patient meet any 2 criteria? No. Patient's initial          

      sepsis screen is negative. Does the patient have a suspected source of infection? No.       

      Patient's initial sepsis screen is negative. Risk Assessment: Do you want to hurt           

      yourself or someone else? Patient reports no desire to harm self or others. Onset of        

      symptoms was 2023.                                                                  

10:57 Method Of Arrival: Wheelchair                                                           bp  

10:57 Acuity: LG 3                                                                           bp  

10:57 Chief complaint: Patient states: SOB since .                                      iw  

                                                                                                  

Triage Assessment:                                                                                

11:00 General: Appears in no apparent distress. comfortable. Respiratory: Respiratory pattern iw  

      is regular, symmetrical. Respiratory: Onset: The symptoms/episode began/occurred , the patient has mild shortness of breath.                                                

11:00 General: Appears in no apparent distress.                                               iw  

                                                                                                  

Historical:                                                                                       

- Allergies:                                                                                      

10:56 PENICILLINS;                                                                            bp  

- PMHx:                                                                                           

10:56 Hypertensive disorder;                                                                  bp  

                                                                                                  

- Immunization history:: Adult Immunizations up to date, Client reports receiving the             

  2nd dose of the Covid vaccine.                                                                  

- Social history:: Smoking status: Patient reports the use of cigarette tobacco                   

  products, Patient/guardian denies using tobacco, Stopped _ months ago 2.                        

                                                                                                  

                                                                                                  

Screenin:04 [facility] ED Fall Risk Assessment (Adult) Score/Fall Risk Level 0 - 2 = Low Risk. Abuse  iw  

      screen: Denies threats or abuse. Nutritional screening: No deficits noted. Tuberculosis     

      screening: No symptoms or risk factors identified.                                          

                                                                                                  

Assessment:                                                                                       

11:04 General: Appears in no apparent distress. Behavior is calm, cooperative. General:       iw  

      Denies fever, chills. Pain: Complains of pain in right clavicle, left clavicle,             

      anterior aspect of right upper chest and anterior aspect of left upper chest. Neuro:        

      Level of Consciousness is awake, alert, obeys commands, Oriented to person, place,          

      time, situation, Moves all extremities. Full function. Cardiovascular: Rhythm is            

      regular. Respiratory: Reports shortness of breath on exertion cough that is productive,     

      Airway is patent Respiratory effort is even, unlabored, GI: Abdomen is non-distended.       

      Derm: Skin is intact, is healthy with good turgor. Musculoskeletal: Range of motion:        

      intact in all extremities.                                                                  

12:58 Reassessment: Patient appears in no apparent distress at this time. Patient and/or      iw  

      family updated on plan of care and expected duration. Pain level reassessed. Patient is     

      alert, oriented x 3, equal unlabored respirations, skin warm/dry/pink.                      

13:17 Reassessment: Patient appears in no apparent distress at this time. Patient and/or      iw  

      family updated on plan of care and expected duration. Pain level reassessed. Patient is     

      alert, oriented x 3, equal unlabored respirations, skin warm/dry/pink.                      

                                                                                                  

Vital Signs:                                                                                      

11:00  / 81; Pulse 81; Resp 18; Temp 97.7; Pulse Ox 99% ; Pain 0/10;                    bp  

13:38  / 81; Pulse 94; Resp 16; Pulse Ox 98% on R/A;                                    iw  

11:00 Pain Scale: Adult                                                                       bp  

                                                                                                  

ED Course:                                                                                        

10:55 Patient arrived in ED.                                                                  im  

10:56 Oswaldo Desouza MD is Attending Physician.                                             josh 

10:56 Arm band placed on Patient placed in an exam room, on a stretcher.                      bp  

10:57 Triage completed.                                                                       bp  

11:04 Betty Love, RN is Primary Nurse.                                                   iw  

11:15 Inserted saline lock: 20 gauge in left antecubital area, using aseptic technique.       iw  

12:00 Provided Education on: CT scan.                                                         iw  

12:01 XRAY Chest (1 view) In Process Unspecified.                                             EDMS

12:34 CT Chest For PE Angio In Process Unspecified.                                           EDMS

12:59 Patient has correct armband on for positive identification.                             iw  

13:57 SIRISHA Crouch MD is Referral Physician.                                                      josh 

14:22 IV discontinued, intact, bleeding controlled, No redness/swelling at site. Pressure     zm  

      dressing applied.                                                                           

14:26 No provider procedures requiring assistance completed.                                  iw  

14:26 IV discontinued, intact, bleeding controlled, No redness/swelling at site. Pressure     iw  

      dressing applied.                                                                           

                                                                                                  

Administered Medications:                                                                         

11:52 Drug: NS 0.9% IV 1000 ml Route: IV; Rate: 1 bolus; Site: left antecubital;              iw  

13:00 Follow up: IV Status: Completed infusion                                                iw  

11:52 Drug: Famotidine IVP 20 mg Route: IVP; Site: left antecubital;                          iw  

12:30 Follow up: Response: No adverse reaction                                                iw  

11:52 Drug: MethylPrednisoLONE  mg Route: IVP; Site: left antecubital;                 iw  

12:30 Follow up: Response: No adverse reaction                                                iw  

11:52 Drug: predniSONE PO 60 mg Route: PO;                                                    iw  

12:15 Follow up: Response: No adverse reaction                                                iw  

11:52 Drug: Levalbuterol Inhalation 3.75 mg Route: Inhalation;                                iw  

11:52 Drug: Ipratropium Inhalation Aerosol 0.5 mg Route: Inhalation;                          iw  

                                                                                                  

                                                                                                  

Medication:                                                                                       

11:05 VIS not applicable for this client.                                                     iw  

                                                                                                  

Outcome:                                                                                          

13:57 Discharge ordered by MD. moreno 

14:26 Discharged to home ambulatory.                                                          iw  

14:26 Condition: good                                                                             

14:26 Discharge instructions given to patient, Instructed on discharge instructions, follow       

      up and referral plans. medication usage, Demonstrated understanding of instructions,        

      follow-up care, medications, Prescriptions given X 4.                                       

14:27 Patient left the ED.                                                                    iw  

                                                                                                  

Signatures:                                                                                       

Dispatcher [employer]                           EDMS                                                 

Oswaldo Desouza MD MD cha Williams, Irene, RN                     [facility]                                                      

Maurizio Valverde RN RN bp Martinez, Zaina zm Mendoza, Itzel                                                                                  

                                                                                                  

Corrections: (The following items were deleted from the chart)                                    

10:57 10:56 Social history: Smoking status: Patient denies any tobacco usage or history of. bpbp  

19:32 10:20 Respiratory: Onset: The symptoms/episode began/occurred , the patient has   iw  

      mild shortness of breath iw                                                                 

19:32 10:20 General: Appears in no apparent distress. comfortable, iw                         iw  

19:32 10:20 Respiratory: Respiratory pattern is regular, symmetrical, iw                      iw  

                                                                                                  

************************************************************************************************** Pt. reports "feeling full faster".  In addition pt. reported being under an increased about of personal/emotional stress as well as concerns with her medical health.

## 2023-08-24 NOTE — RAD REPORT
EXAM DESCRIPTION:  CT - Chest For Pe Angio - 8/24/2023 12:33 pm

 

CLINICAL HISTORY:   Chest pain

 

COMPARISON:  None.

 

TECHNIQUE:  Dynamically enhanced axial 3 mm thick images of the chest were obtained during administra
tion of 100 mL Isovue 370 IV contrast. Coronal and oblique reconstruction images were generated and r
eviewed. Exam utilizes a protocol for optimal evaluation of pulmonary arterial tree.

 

Maximum intensity projections 3D imaging was utilized

 

All CT scans are performed using dose optimization technique as appropriate and may include automated
 exposure control or mA/KV adjustment according to patient size.

 

FINDINGS:  A pulmonary embolus is not seen.

 

A thoracic aortic aneurysm is not noted.

 

A pleural effusion is not seen. A pericardial effusion is not seen.

 

A lung consolidation is not present.

 

Several hepatic cysts

 

Small area sclerosis within midthoracic vertebral body

 

IMPRESSION:  Negative for a pulmonary embolism.

 

Small area sclerosis within a midthoracic vertebral body is nonspecific. A followup x-ray of the thor
acic spine recommended in 3 months to assess stability

## 2023-08-24 NOTE — XMS REPORT
Continuity of Care Document

                           Created on:2023



Patient:GAYLE ARTEAGA

Sex:Male

:1950

External Reference #:[id number]





Demographics







                          Address                   [address] [place] [address]

 

                          Home Phone                [phone]

 

                          Mobile Phone              [phone]

 

                          Email Address             [email or web address]

 

                          Preferred Language        en

 

                          Marital Status            Unknown

 

                          Zoroastrianism Affiliation     Unknown

 

                          Race                      Unknown

 

                          Additional Race(s)        White



                                                    Unavailable

 

                          Ethnic Group              Not  or 









Author







                          Organization              [facility]

t

 

                          Address                   [address]



                                                    [place] [address]

 

                          Phone                     [phone]









Support







                Name            Relationship    Address         Phone

 

                JOSUÉ ARTEAGA               Unavailable     Unavailable

 

                Shannon Clifton  Child           [address] [phone]



                                                [place] [address] 









Care Team Providers







                    Name                Role                Phone

 

                    Elie Crouch      Primary Care Physician [phone]

 

                    LEXX HADLEY Attending Clinician Unavailable

 

                    TRISH HOBBS     Attending Clinician Unavailable

 

                    ANGELICA WALLACE Attending Clinician Unavailable

 

                    ANJALI BRICEÑO      Attending Clinician Unavailable

 

                    Anjali Nguyen  Attending Clinician [phone]

 

                    Unknown, Attending  Attending Clinician Unavailable

 

                    Ameena Conklin Attending Clinician [phone]

 

                    AMEENA PATTERSON     Attending Clinician Unavailable

 

                    Sanju Guerra Attending Clinician [phone]

 

                    MERA ANTONY       Attending Clinician Unavailable

 

                    Stefan Watters MD  Attending Clinician [phone]

 

                    Diego Dillard MD     Attending Clinician [phone]

 

                    Diego Dillard Attending Clinician [phone]

 

                    MEHREEN HAMPTON   Attending Clinician Unavailable

 

                    SHANNON SUGGS        Attending Clinician Unavailable

 

                    Kat          Attending Clinician Unavailable

 

                    Willam Chaney Attending Clinician [phone]

 

                    Fe Katz Attending Clinician [phone]

 

                    Provider, [facility] Attending Clinician Unavailable

 

                    FE FUNES    Attending Clinician Unavailable

 

                    Doctor Unassigned, No Name Attending Clinician Unavailable

 

                    DIEGO DILLARD M.D.  Attending Clinician Unavailable

 

                    NAJMA SHANNON M.D. Attending Clinician Unavailable

 

                    KERI NAM M.D. Attending Clinician Unavailable

 

                    Kat          Admitting Clinician Unavailable









Payers







           Payer Name Policy Type Policy Number Effective Date Expiration Date LITO garcia

 

           [employer] MEDICARE            [id number] 2020            



           SUPPLEMENT                       00:00:00              

 

           MEDICARE PART B            [id number] 2019 



                                            00:00:00   00:00:00   

 

           MEDICARE PART A            [id number] 2015            



           \T\ B                            00:00:00              

 

           [place] [facility]            [id number] 2022            



           MEDICARE                         00:00:00              



           SUPPLEMENT                                             

 

           [employer]                [id number] 2021            



           HEALTHCARE/[employer]                       00:00:00              

 

           [place]:            [id number] 2015            



           [employer]                       00:00:00              

 

           [facility]            [id number] 2021            



           [employer] (MEDICARE                       00:00:00              



           SUPPLEMENT)                                             







Problems







       Condition Condition Condition Status Onset  Resolution Last   Treating Co

mments 

Source



       Name   Details Category        Date   Date   Treatment Clinician        



                                                 Date                 

 

       Foot drop Foot drop Disease Active                              [facility]



                                   6-22                               Health



                                   00:00:                             



                                   00                                 

 

       Retinal Retinal Disease Active                              [facility]



       detachment detachment               6-22                               He

alth



                                   00:00:                             



                                   00                                 

 

       Rhegmatoge Rhegmatoge Disease Active 2022                      Overview

: [facility]



       nous   nous                 2-07                        [facility]



       retinal retinal               00:00:                      g of this 



       detachment detachment               00                          note   



       of right of right                                           might be 



       eye    eye                                              different 



                                                               from the 



                                                               original. 



                                                               Hx of  



                                                               mac-off 



                                                               RRD    



                                                               repaired 



                                                               2022 by 



                                                               Dr. Wallace 



                                                               Last   



                                                               Assessmen 



                                                               t & Plan: 



                                                               [facility] 



                                                               g of this 



                                                               note   



                                                               might be 



                                                               different 



                                                               from the 



                                                               original. 



                                                               Stable 



                                                               today on 



                                                               optos/DFE 



                                                               Upcoming 



                                                               appt with 



                                                               Dr. Wallace 

 

       Adrenal Adrenal Disease Active                              [facility]



       adenoma, adenoma,               8-30                               Health



       right  right                00:00:                             



                                   00                                 

 

       Grief  Grief  Disease Active                              [facility]



       reaction reaction               5-05                               Health



                                   00:00:                             



                                   00                                 

 

       Wheezing Wheezing Disease Active                              [facility]



                                   3-18                               Health



                                   00:00:                             



                                   00                                 

 

       Cough  Cough  Disease Active                              [facility]



                                   3-18                               Health



                                   00:00:                             



                                   00                                 

 

       Anesthesia Anesthesia Disease Active                              [facility]

T



       of skin of skin               3-18                               Health



                                   00:00:                             



                                   00                                 

 

       Abnormal Abnormal Disease Active                              [facility]



       chest  chest                3-18                               Health



       x-ray  x-ray                00:00:                             



                                   00                                 

 

       Ptosis of Ptosis of Disease Active 2021                      Last   [facility]



       both   both                 2-06                        [facility]



       eyelids eyelids               00:00:                      t & Plan: 



                                   00                          Formatt[facility] 



                                                               g of this 



                                                               note   



                                                               might be 



                                                               different 



                                                               from the 



                                                               original. 



                                                               Refer to 



                                                               leonie 



                                                               ptosis / 



                                                               bleph  

 

       Floppy Floppy Disease Active 2021                      Last   [facility]



       eyelid eyelid               2-                        [facility]



       syndrome syndrome               00:00:                      t & Plan: 



                                   00                          Formatt[facility] 



                                                               g of this 



                                                               note   



                                                               might be 



                                                               different 



                                                               from the 



                                                               original. 



                                                               Has had 



                                                               sleep  



                                                               apnea  



                                                               diagnosed 



                                                               but is 



                                                               not being 



                                                               treated, 



                                                               d/w pt 



                                                               importanc 



                                                               e of   



                                                               treating 



                                                               sleep  



                                                               apnea for 



                                                               cardiovas 



                                                               cular  



                                                               health he 



                                                               will d/w 



                                                               his    



                                                               PCPContin 



                                                               ue tears, 



                                                               add    



                                                               overnight 



                                                               ointment; 



                                                               if that 



                                                               isn't  



                                                               enough 



                                                               can trial 



                                                               eye    



                                                               decker/m 



                                                               oisture 



                                                               chamber 

 

       Pseudophak Pseudophak Disease Active 2021                      Last   [facility]

T



       ia, both ia, both               2-06                        [facility]

lth



       eyes   eyes                 00:00:                      t & Plan: 



                                   00                          Formattin 



                                                               g of this 



                                                               note   



                                                               might be 



                                                               different 



                                                               from the 



                                                               original. 



                                                               MRx given 

 

       Vitreous Vitreous Disease Active 2021                      Last   [facility]



       syneresis syneresis               2-06                        [facility]

ealth



       of both of both               00:00:                      t & Plan: 



       eyes   eyes                 00                          Formatt[facility] 



                                                               g of this 



                                                               note   



                                                               might be 



                                                               different 



                                                               from the 



                                                               original. 



                                                               D/w pt 



                                                               etiology 



                                                               of     



                                                               floaters 

 

       Nightmare Nightmare Disease Active                              [facility]



       disorder disorder                                              Health



                                   00:00:                             



                                   00                                 

 

       Low back Low back Disease Active                              [facility]



       pain   pain                 6-12                               Health



                                   00:00:                             



                                   00                                 

 

       Left foot Left foot Disease Active                              [facility]



       drop   drop                 6-12                               Health



                                   00:00:                             



                                   00                                 

 

       Scoliosis Scoliosis Disease Active                              [facility]



                                   6-12                               Health



                                   00:00:                             



                                   00                                 

 

       Lumbar Lumbar Disease Active 2017                             [facility]



       radiculopa radiculopa               2-06                               He

alth



       thy    thy                  00:00:                             



                                   00                                 

 

       Numbness Numbness Disease Active 2017                             [facility]



       and    and                  2-06                               Health



       tingling tingling               00:00:                             



                                   00                                 

 

       Spinal  Spinal Problem Active 2023               [facility]

manuela



       stenosis stenosis                         11:57:27               l



       in     in                   00:00:                             [place]



       cervical cervical               00                                 



       region region                                                  



       (disorder) (disorder)                                                  



              Active                                                  



              2016                                                  



              Problem                                                  



              2023                                                  



               [facility]                                                  



              Neuro,ERNESTO [facility]                                                  



              [facility]                                                  



              [facility]                                                  

 

       Apnea  Apnea  Disease Active                              [facility]



                                                                  [facility]



                                   00:00:                             



                                   00                                 

 

       History of History of Problem Resolve                                    

[facility]



       hyperlipid hyperlipid        d                                         Ph

ysici



       emia   emia                                                    ans

 

       History of History of Problem Resolve                                    

[facility]



       hypertensi hypertensi        d                                         Ph

ysici



       on     on                                                      ans

 

       Leg    Leg    Problem Active                                    [facility]



       numbness numbness                                                  Physic

i



                                                                      ans

 

       Degenerati Degenerati Problem Active                                    U

T



       ve     ve                                                      Physici



       scoliosis scoliosis                                                  ans

 

       Dream  Dream  Problem Active                                    [facility]



       enactment enactment                                                  Phys

ici



       behavior behavior                                                  ans

 

       Severe Severe Problem Active                                    [facility]



       obstructiv obstructiv                                                  Ph

ysici



       e sleep e sleep                                                  ans



       apnea  apnea                                                   

 

       No known No known Disease                                           Unive

rs



       active active                                                  ity of



       problems problems                                                  [facility]

 

       Gastroesop  Gastroeso Problem Active               2023            

   [facility]



       hageal phageal                             11:57:27               l



       reflux reflux                                                  [facility]



       disease disease                                                  



       (disorder) (disorder)                                                  



              Active                                                  



              Problem                                                  



              2023                                                  



              [facility]                                                  



              [facility]                                                  

 

       Carpal  Carpal Problem Active               2023               [facility]

manuela



       tunnel tunnel                             11:57:27               l



       syndrome syndrome                                                  [place]

n



       (disorder) (disorder)                                                  



              Active                                                  



              Problem                                                  



              2023                                                  



              [facility]                                                    



              [facility]                                                  

 

       Smoker  Smoker Problem Active               2023               [facility]

manuela



       (finding) (finding)                             11:57:27               l



              Active                                                  [facility]



              Problem                                                  



              2023                                                  



               [facility]                                                  



              [facility]                                                  

 

       Hypertensi  Hypertens Problem Active               2023            

   [facility]



       ve     marjorie                                11:57:27               l



       disorder, disorder,                                                  [place]

kvng



       systemic systemic                                                  



       arterial arterial                                                  



       (disorder) (disorder)                                                  



              Active                                                  



              Problem                                                  



              2023                                                  



              [facility]                                                  

 

       Hyperlipid  Hyperlipi Problem Active               2023            

   [facility]



       emia   demia                              11:57:27               l



       (disorder) (disorder)                                                  He

rmann



              Active                                                  



              Problem                                                  



              2023                                                  



               [facility]                                                  

 

       Numbness  Numbness Problem Active               2023               

[facility]



       of hand of hand                             11:57:27               l



       (finding) (finding)                                                  Herm

kvng



              Active                                                  



              Problem                                                  



              2023                                                  



               Bilateral                                                  



              [facility]                                                  



              [facility]                                                  

 

       Finding  Finding Problem Active               2023               Me

moria



       relating relating                             11:57:27               l



       to moist to moist                                                  [place]

n



       tobacco tobacco                                                  



       use    use                                                     



       (finding) (finding)                                                  



              Active                                                  



              Problem                                                  



              2023                                                  



              [facility]                                                  



              [facility]                                                  

 

       Spinal  Spinal Problem Active               2023               Abdelrahman

manuela



       stenosis stenosis                             11:57:27               l



       of lumbar of lumbar                                                  Herm

kvng



       region region                                                  



       (disorder) (disorder)                                                  



              Active                                                  



              Problem                                                  



              2023                                                  



              [facility],                                                  



              [facility]                                                  

 

       Vertigo  Vertigo Problem Active               2023               Me

moria



       (finding) (finding)                             11:57:27               l



              Active                                                  Shmuel



              Problem                                                  



              2023                                                  



               [facility]                                                    



              Neurology                                                  



              [place]                                                  

 

       Anxiety  [facility] Problem [facility]               2022               [facility]



       (finding) (finding)        d                    03:30:39               l



              Resolved                                                  Shmuel



              Problem                                                  



              2022                                                  



              [facility],                                                  



              MERLY Michel                                                  

 

       R91.1 -  R91.1 - Diagnosis Active               2022-05-10               

[facility]



       SOLITARY SOLITARY                             14:36:00               l



       PULMONARY PULMONARY                                                  Herm

kvng



       NODULE NODULE                                                  



       R05.9  R05.9                                                   



              Active                                                   



              MERLY Michel                                                  







Allergies, Adverse Reactions, Alerts







       Allergy Allergy Status Severity Reaction(s) Onset  Inactive Treating Comm

ents 

Source



       Name   Type                        Date   Date   Clinician        

 

       Penicill Propensi Active        Unknown 2021                      [facility]



       ins    ty to                       2-06                        [facility]



              adverse                      00:00:                      



              reaction                      00                          



              s                                                       

 

       Penicill Propensi Active        Rash                         [facility]

s



       ins    ty to                       6-16                        ity of



              adverse                      00:00:                      [place]



              reaction                      00                          Medical



              s                                                       Branch

 

       Penicill Propensi Active        Rash   0                      Univer

s



       ins    ty to                       6-16                        ity of



              adverse                      00:00:                      [place]



              reaction                      00                          Medical



              s                                                       Branch

 

       PENICILL Drug   Active        Rash   -0                      [facility]



       INS    Class                       6-16                        ity of



                                          00:00:                      [place]



                                          00                          Medical



                                                                      Branch

 

       Penicill Allergy Active        Other                              [facility]



       ins    to drug                                                  [facility]



              (finding                                                  ans



              )                                                       

 

       penicill penicill Active Mild                                      [facility]

a



       ins    ins                                                     l



                                                                      Shmuel

 

       NO KNOWN Drug   Active                                           [facility]



       ALLERGIE Class                                                   ity of



       S                                                              [place]



                                                                      Medical



                                                                      Branch

 

       penicill penicill Active                                           [facility]

a



       ins    ins                                                     l



                                                                      [place]

 

       PENICILL Allergy Active                                           [place]



       INS    to                                                      [place]



              substanc                                                  [facility]



              e                                                       







Family History







           Family Member Diagnosis  Comments   Start Date Stop Date  Source

 

           Mother     Family history of                                 

  [facility]

 

           Mother     Family history of                                  [facility]

[facility]



                      cerebrovascular accident                                  



                      (CVA)                                       

 

           Father     Family history of malignant                               

   [facility]



                      neoplasm                                    

 

           Father     Family history of                                 

  [facility]







Social History







           Social Habit Start Date Stop Date  Quantity   Comments   Source

 

           Gender identity                                             [facility]

 

           Sexual orientation                                             [facility]

[facility]

 

           History of tobacco                       Snuff User            [facility]

[facility]



           use                                                    

 

           History of Social 2023                       [facility]

ity of



           function   00:00:00   00:00:00                         [facility]

 

           Alcohol intake 2023 2.86 /d               [facility]



                      00:00:00   00:00:00                         

 

           Exposure to 2022 Not sure              [facility]



           SARS-CoV-2 (event) 00:00:00   09:12:00                         

 

           Tobacco use and 2022 User of               [facility]



           exposure   00:00:00   00:00:00   smokeless             



                                            tobacco               

 

           Cigarette  2022                       [facility]



           pack-years 00:00:00   00:00:00                         

 

           Social History 2015                       [facility] LEEANN

larissa



                      20:41:29   20:41:29                         

 

           Sex Assigned At [birth year] M                     [facility]



           Birth      00:00:00   00:00:00                         









                Smoking Status  Start Date      Stop Date       Source

 

                Never Smoker                                    [facility]

[facility] [place]

 

                Ex-smoker (finding)                                 [facility]

s

 

                Tobacco smoking consumption                                 [facility]

ealt



                unknown                                         

 

                Tobacco smoking status 2023 14:14:18                 [facility]

 

                Occasional tobacco smoker 2022 00:00:00                 [facility]







Medications







       Ordered Filled Start  Stop   Current Ordering Indication Dosage Frequency

 Signature

                    Comments            Components          Source



     Medication Medication Date Date Medication? Clinician                (SIG) 

          



     Name Name                                                   

 

     benzonatate      2023- Yes       [id number] 100mg      Take 1         

  [facility]



     (TESSALON      -                          capsule by           ity

 of



     PERLES) 100      00:00: 04:59                          mouth           [place]

s



     mg capsule      00   :00                           every 8           Medica

l



                                                  (eight)           Branch



                                                  hours for           



                                                  10 days.           

 

     benzonatate      2023- Yes       [id number] 100mg      Take 1         

  [facility]



     (TESSALON      8--27                          capsule by           ity

 of



     PERLES) 100      00:00: 04:59                          mouth           [place]

s



     mg capsule      00   :00                           every 8           Medica

l



                                                  (eight)           Branch



                                                  hours for           



                                                  10 days.           

 

     dexamethaso      2023- No        [id number] 10mg                     U

nivers



     ne                                                 ity of



     (DECADRON)      14:53: 14:59                                         [place]



     injection      00   :00                                          Medical



     10 mg                                                        Branch

 

     dexamethaso      2023- No        [id number] 10mg      10 mg,          

 [facility]



     ne                                  Intramuscu           ity of



     (DECADRON)      14:53: 14:59                          lar, ONCE,           

[place]



     injection      00   :00                           1 dose, On           Medi

marlene



     10 mg                                         Mon            Branch



                                                  23 at           



                                                  1000,           



                                                  Routine           

 

     predniSONE            Yes       [id number]           Take two          

 [facility]



     20 mg      7-24                               tablets           ity of



     tablet      00:00:                               daily for           [place]



                                                4 days,           Medical



                                                  then one           Branch



                                                  tablet           



                                                  daily for           



                                                  4 days,           



                                                  then half           



                                                  tablet           



                                                  daily for           



                                                  4 days.           

 

     predniSONE            Yes       [id number]           Take two          

 [facility]



     20 mg      7-24                               tablets           ity of



     tablet      00:00:                               daily for           [place]



                                                4 days,           Medical



                                                  then one           Branch



                                                  tablet           



                                                  daily for           



                                                  4 days,           



                                                  then half           



                                                  tablet           



                                                  daily for           



                                                  4 days.           

 

     predniSONE            Yes       [id number]           Take two          

 [facility]



     20 mg      7-24                               tablets           ity of



     tablet      00:00:                               daily for           [place]



                                                4 days,           Medical



                                                  then one           Branch



                                                  tablet           



                                                  daily for           



                                                  4 days,           



                                                  then half           



                                                  tablet           



                                                  daily for           



                                                  4 days.           

 

     predniSONE            Yes       [id number]           Take two          

 [facility]



     20 mg      7-24                               tablets           ity of



     tablet      00:00:                               daily for           [place]



                                                4 days,           Medical



                                                  then one           Branch



                                                  tablet           



                                                  daily for           



                                                  4 days,           



                                                  then half           



                                                  tablet           



                                                  daily for           



                                                  4 days.           

 

     albuterol      2023- Yes       [id number] 2{puff}      Inhale 2       

    [facility]



     90         08-04                          Puffs           ity of



     mcg/actuati      00:00: 04:59                          every 6           [place]

[place]



     on inhaler      00   :00                           (six)           Medical



                                                  hours as           Branch



                                                  needed for           



                                                  Wheezing           



                                                  for up to           



                                                  10 days.           

 

     codeine-gua      2023- Yes            10mL      Take 10 mL          

 [facility]



     ifenesin      30                          by mouth           ity of



      mg/5      00:00: 04:59                          every 6           Te

[place]



     mL oral      00   :00                           (six)           Medical



     solution                                         hours as           Branch



                                                  needed for           



                                                  Cough for           



                                                  up to 5           



                                                  days.           



                                                  Indication           



                                                  s: cough           

 

     atorvastati            Yes                      40 mg = 1           M

emoria



     n 40 mg      6-02                               tab, PO,           l



     oral tablet      14:35:                               Bedtime, #           

[place]



               00                                 30 tab, 0           



                                                  Refill(s)           

 

     atorvastati            Yes                                     [facility]



     n (Lipitor)      2-22                                              Health



     40 MG      00:00:                                              



     tablet      00                                                

 

     tamsulosin      2022      Yes            .4mg QD   Take 0.4           [facility]



     (Flomax)      2-22                               mg by           Health



     0.4 MG 24      09:22:                               mouth 1           



     hr capsule      19                                 (one) time           



                                                  each day.           

 

     citalopram      2022      Yes                 QD   Take by           [facility]



     (CeleXA) 20      2-22                               mouth 1           Healt

h



     MG tablet      09:22:                               (one) time           



               19                                 each day.           

 

     methocarbam      2022      Yes            500mg Q.25D Take 500           

[facility]



     ol        2-22                               mg by           Health



     (Robaxin)      09:22:                               mouth 4           



     500 MG      19                                 (four)           



     tablet                                         times a           



                                                  day.           

 

     tamsulosin      2022      Yes            .4mg QD   Take 0.4           [facility]



     (Flomax)      2-22                               mg by           Health



     0.4 MG 24      09:22:                               mouth 1           



     hr capsule      19                                 (one) time           



                                                  each day.           

 

     citalopram      2022      Yes                 QD   Take by           [facility]



     (CeleXA) 20      2-22                               mouth 1           Healt

h



     MG tablet      09:22:                               (one) time           



               19                                 each day.           

 

     methocarbam      2022      Yes            500mg Q.25D Take 500           

[facility]



     ol        2-22                               mg by           Health



     (Robaxin)      09:22:                               mouth 4           



     500 MG      19                                 (four)           



     tablet                                         times a           



                                                  day.           

 

     tamsulosin      2022      Yes            .4mg QD   Take 0.4           [facility]



     (Flomax)      2-07                               mg by           Health



     0.4 MG 24      10:11:                               mouth 1           



     hr capsule      45                                 (one) time           



                                                  each day.           

 

     citalopram      2022      Yes                 QD   Take by           [facility]



     (CeleXA) 20      2-07                               mouth 1           Healt

h



     MG tablet      10:11:                               (one) time           



               45                                 each day.           

 

     methocarbam      2022      Yes            500mg Q.25D Take 500           

[facility]



     ol        2-07                               mg by           Health



     (Robaxin)      10:11:                               mouth 4           



     500 MG      45                                 (four)           



     tablet                                         times a           



                                                  day.           

 

     tamsulosin            Yes            .4mg QD   Take 0.4           [facility]



     (Flomax)      8-30                               mg by           Health



     0.4 MG 24      08:50:                               mouth 1           



     hr capsule      44                                 (one) time           



                                                  each day.           

 

     citalopram      2022-0      Yes                 QD   Take by           [facility]



     (CeleXA) 20      8-30                               mouth 1           Healt

h



     MG tablet      08:50:                               (one) time           



               44                                 each day.           

 

     methocarbam      2022-0      Yes            500mg Q.25D Take 500           

[facility]



     ol        8-30                               mg by           Health



     (Robaxin)      08:50:                               mouth 4           



     500 MG      44                                 (four)           



     tablet                                         times a           



                                                  day.           

 

     omeprazole      2022-0 2022- No             10mg QD   Take 10 mg           

[facility]



     (PriLOSEC)      5-05 05-05                          by mouth 1           [facility]

alth



     10 MG DR      17:17: 00:00                          (one) time           



     capsule      24   :00                           each day.           



                                                  Do not           



                                                  crush or           



                                                  chew.           

 

     lovastatin      2022-0 2022- No             10mg      Take 10 mg           

[facility]



     (Mevacor)      5-05 05-05                          by mouth           [facility]

h



     10 MG      17:17: 00:00                          every           



     tablet      21   :00                           night.           

 

     lisinopril      2022-0 2022- No             10mg QD   Take 10 mg           

[facility]



     10 MG      5-05 05-05                          by mouth 1           [facility]



     tablet      17:17: 00:00                          (one) time           



               18   :00                           each day.           

 

     gabapentin      2022-0 2022- No                  [id number] Take by        

   [facility]



     (Neurontin)      5-05 05-05                     [id number] mouth 3         

  [facility]



     250 MG/5ML      17:17: 00:00                     3D   (three)           



     solution      18   :00                           times a           



                                                  day.           

 

     tamsulosin      2022-0      Yes            .4mg QD   Take 0.4           [facility]



     (Flomax)      5-05                               mg by           Health



     0.4 MG 24      13:49:                               mouth 1           



     hr capsule      43                                 (one) time           



                                                  each day.           

 

     citalopram      2022-0      Yes            10mg QD   Take 10 mg           [facility]

T



     (CeleXA) 10      5-05                               by mouth 1           [facility]

alth



     MG/5ML      13:49:                               (one) time           



     solution      43                                 each day.           

 

     methocarbam      2022-0      Yes            500mg Q.25D Take 500           

[facility]



     ol        5-05                               mg by           Health



     (Robaxin)      13:49:                               mouth 4           



     500 MG      43                                 (four)           



     tablet                                         times a           



                                                  day.           

 

     tamsulosin      2022-0      Yes            .4mg QD   Take 0.4           [facility]



     (Flomax)      5-05                               mg by           Health



     0.4 MG 24      13:49:                               mouth 1           



     hr capsule      43                                 (one) time           



                                                  each day.           

 

     citalopram      2022-0      Yes            10mg QD   Take 10 mg           [facility]

T



     (CeleXA) 10      5-05                               by mouth 1           [facility]

alth



     MG/5ML      13:49:                               (one) time           



     solution      43                                 each day.           

 

     methocarbam      2022-0      Yes            500mg Q.25D Take 500           

[facility]



     ol        5-05                               mg by           Health



     (Robaxin)      13:49:                               mouth 4           



     500 MG      43                                 (four)           



     tablet                                         times a           



                                                  day.           

 

     tamsulosin      2022-0      Yes            .4mg QD   Take 0.4           [facility]



     (Flomax)      5-05                               mg by           Health



     0.4 MG 24      13:49:                               mouth 1           



     hr capsule      43                                 (one) time           



                                                  each day.           

 

     citalopram      2022-0      Yes            10mg QD   Take 10 mg           [facility]

T



     (CeleXA) 10      5-05                               by mouth 1           [facility]

alth



     MG/5ML      13:49:                               (one) time           



     solution      43                                 each day.           

 

     methocarbam      2022-0      Yes            500mg Q.25D Take 500           

[facility]



     ol        5-05                               mg by           Health



     (Robaxin)      13:49:                               mouth 4           



     500 MG      43                                 (four)           



     tablet                                         times a           



                                                  day.           

 

     tamsulosin      2022-0      Yes            .4mg QD   Take 0.4           [facility]



     (Flomax)      5-05                               mg by           Health



     0.4 MG 24      13:49:                               mouth 1           



     hr capsule      43                                 (one) time           



                                                  each day.           

 

     citalopram      2022-0      Yes            10mg QD   Take 10 mg           [facility]

T



     (CeleXA) 10      5-05                               by mouth 1           [facility]

alth



     MG/5ML      13:49:                               (one) time           



     solution      43                                 each day.           

 

     methocarbam      2022-0      Yes            500mg Q.25D Take 500           

[facility]



     ol        5-05                               mg by           Health



     (Robaxin)      13:49:                               mouth 4           



     500 MG      43                                 (four)           



     tablet                                         times a           



                                                  day.           

 

     omeprazole      2022-0      Yes                                     [facility]



     (PriLOSEC)      2-13                                              [facility]



     20 MG DR      00:00:                                              



     capsule      00                                                

 

     lovastatin      2022-0      Yes                                     [facility]



     (Mevacor)      2-13                                              Health



     20 MG      00:00:                                              



     tablet      00                                                

 

     lisinopril      2022-0      Yes                                     [facility]



     20 MG      2-13                                              Health



     tablet      00:00:                                              



               00                                                

 

     omeprazole      2022-0      Yes                                     [facility]



     (PriLOSEC)      2-13                                              [facility]



     20 MG DR      00:00:                                              



     capsule      00                                                

 

     lovastatin      2022-0      Yes                                     [facility]



     (Mevacor)      2-13                                              [facility]



     20 MG      00:00:                                              



     tablet      00                                                

 

     lisinopril      2-0      Yes                                     [facility]



     20 MG      2-13                                              [facility]



     tablet      00:00:                                              



               00                                                

 

     omeprazole      2022-0      Yes                                     [facility]



     (PriLOSEC)      2-13                                              [facility]



     20 MG DR      00:00:                                              



     capsule      00                                                

 

     lovastatin      2-0      Yes                                     [facility]



     (Mevacor)      2-13                                              [facility]



     20 MG      00:00:                                              



     tablet      00                                                

 

     lisinopril      2-0      Yes                                     [facility]



     20 MG      2-13                                              [facility]



     tablet      00:00:                                              



               00                                                

 

     omeprazole      2-0      Yes                                     [facility]



     (PriLOSEC)      2-13                                              [facility]



     20 MG DR      00:00:                                              



     capsule      00                                                

 

     lovastatin      2-0      Yes                                     [facility]



     (Mevacor)      2-13                                              [facility]



     20 MG      00:00:                                              



     tablet      00                                                

 

     lisinopril      2-0      Yes                                     [facility]



     20 MG      2-13                                              [facility]



     tablet      00:00:                                              



               00                                                

 

     omeprazole      2-0      Yes                                     [facility]



     (PriLOSEC)      2-13                                              [facility]



     20 MG DR      00:00:                                              



     capsule      00                                                

 

     lovastatin      2-0      Yes                                     [facility]



     (Mevacor)      2-13                                              [facility]



     20 MG      00:00:                                              



     tablet      00                                                

 

     lisinopril      2-0      Yes                                     [facility]



     20 MG      2-13                                              Health



     tablet      00:00:                                              



               00                                                

 

     omeprazole      2-0      Yes                                     [facility]



     (PriLOSEC)      2-13                                              [facility]



     20 MG DR      00:00:                                              



     capsule      00                                                

 

     lovastatin      2-0      Yes                                     [facility]



     (Mevacor)      2-13                                              [facility]



     20 MG      00:00:                                              



     tablet      00                                                

 

     lisinopril      2-0      Yes                                     [facility]



     20 MG      2-13                                              [facility]



     tablet      00:00:                                              



               00                                                

 

     omeprazole      2-0      Yes                                     [facility]



     (PriLOSEC)      2-13                                              [facility]



     20 MG DR      00:00:                                              



     capsule      00                                                

 

     lovastatin      2-0      Yes                                     [facility]



     (Mevacor)      2-13                                              [facility]



     20 MG      00:00:                                              



     tablet      00                                                

 

     lisinopril      2-0      Yes                                     [facility]



     20 MG      2-13                                              [facility]



     tablet      00:00:                                              



               00                                                

 

     omeprazole      2-0      Yes                                     [facility]



     (PriLOSEC)      2-13                                              [facility]



     20 MG DR      00:00:                                              



     capsule      00                                                

 

     lisinopril      2-0      Yes                                     [facility]



     20 MG      2-13                                              Health



     tablet      00:00:                                              



               00                                                

 

     lovastatin      2022-0 2023- No                                      [facility]



     (Mevacor)      2-13 06-22                                         [facility]



     20 MG      00:00: 00:00                                         



     tablet      00   :00                                          

 

     benzonatate      2022-0      Yes       [id number] 200mg      Take 2         

  [facility]



     (TESSALON      1-07                               capsules           ity of



     PERLES) 100      00:00:                               by mouth           [facility]

[facility]



     mg capsule      00                                 every 8           [facility]

[facility]



                                                  (eight)           [facility]



                                                  hours as           



                                                  needed for           



                                                  Cough.           

 

     benzonatate            Yes       [id number] 200mg      Take 2         

  [facility]



     (TESSALON      1-07                               capsules           ity of



     PERLES) 100      00:00:                               by mouth           [facility]

[facility]



     mg capsule      00                                 every 8           [facility]

l



                                                  (eight)           [facility]



                                                  hours as           



                                                  needed for           



                                                  Cough.           

 

     benzonatate            Yes       [id number] 200mg      Take 2         

  [facility]



     (TESSALON      1-07                               capsules           ity of



     PERLES) 100      00:00:                               by mouth           [facility]

[facility]



     mg capsule      00                                 every 8           [facility]

l



                                                  (eight)           [facility]



                                                  hours as           



                                                  needed for           



                                                  Cough.           

 

     benzonatate            Yes       [id number] 200mg      Take 2         

  [facility]



     (TESSALON      1-07                               capsules           ity of



     PERLES) 100      00:00:                               by mouth           [facility]

[facility]



     mg capsule      00                                 every 8           [facility]

l



                                                  (eight)           [facility]



                                                  hours as           



                                                  needed for           



                                                  Cough.           

 

     citalopram      2021      Yes            10mg QD   Take 10 mg           U

T



     (CeleXA) 10      2-06                               by mouth 1           [facility]

[facility]



     MG/5ML      09:17:                               (one) time           



     solution      45                                 each day.           

 

     methocarbam      2021      Yes            500mg Q.25D Take 500           

[facility]



     ol        2-06                               mg by           Health



     (Robaxin)      09:17:                               mouth 4           



     500 MG      45                                 (four)           



     tablet                                         times a           



                                                  day.           

 

     omeprazole      2021      Yes            10mg QD   Take 10 mg           [facility]

T



     (PriLOSEC)      2-06                               by mouth 1           [facility]

[facility]



     10 MG DR      09:17:                               (one) time           



     capsule      45                                 each day.           



                                                  Do not           



                                                  crush or           



                                                  chew.           

 

     tamsulosin      2021      Yes                 QD   Take by           [facility]



     (Flomax)      2-06                               mouth 1           [facility]



     0.4 MG 24      09:13:                               (one) time           



     hr capsule      08                                 each day.           

 

     gabapentin      2021      Yes                 [id number] Take by         

  [facility]



     (Neurontin)      2-06                          [id number] mouth 3          

 [facility]



     250 MG/5ML      09:13:                          3D   (three)           



     solution      08                                 times a           



                                                  day.           

 

     lovastatin      2021      Yes            10mg      Take 10 mg           U

T



     (Mevacor)      2-06                               by mouth           [facility]



     10 MG      09:13:                               every           



     tablet      08                                 night.           

 

     lisinopril      2021      Yes            10mg QD   Take 10 mg           [facility]

T



     10 MG      2-06                               by mouth 1           [facility]



     tablet      09:13:                               (one) time           



               07                                 each day.           

 

     gabapentin      2021      Yes                                     [facility]



     (Neurontin)                                                    Health



     300 MG      00:00:                                              



     capsule      00                                                

 

     gabapentin      2021      Yes                                     [facility]



     (Neurontin)                                                    Health



     300 MG      00:00:                                              



     capsule      00                                                

 

     gabapentin      2021      Yes                                     [facility]



     (Neurontin)                                                    [facility]



     300 MG      00:00:                                              



     capsule      00                                                

 

     gabapentin      2021      Yes                                     [facility]



     (Neurontin)                                                    [facility]



     300 MG      00:00:                                              



     capsule      00                                                

 

     gabapentin      2021      Yes                                     [facility]



     (Neurontin)                                                    [facility]



     300 MG      00:00:                                              



     capsule      00                                                

 

     gabapentin      2021      Yes                                     [facility]



     (Neurontin)                                                    [facility]



     300 MG      00:00:                                              



     capsule      00                                                

 

     gabapentin      2021      Yes                                     [facility]



     (Neurontin)                                                    [facility]



     300 MG      00:00:                                              



     capsule      00                                                

 

     gabapentin      2021      Yes                                     [facility]



     (Neurontin)                                                    Health



     300 MG      00:00:                                              



     capsule      00                                                

 

     gabapentin      2021      Yes                      See            [facility]



     300 mg oral      0-05                               Instructio           l



     capsule      13:27:                               ns, TAKE 1           [facility]

[facility]



               00                                 CAPSULE BY           



                                                  MOUTH           



                                                  TWICE           



                                                  DAILY, #           



                                                  [id number]            



                                                  unknown           



                                                  unit, 3           



                                                  Refill(s),           



                                                  Pharm[facility]           



                                                  [facility]           



                                                  [facility],           



                                                  [id number],           



                                                  cm,            



                                                  21           



                                                  9:31:00           



                                                  CDT,           



                                                  Height,           



                                                  96.364,           



                                                  kg,            



                                                  21           



                                                  9:31:00           



                                                  CDT,           



                                                  Weight           

 

     gabapentin      2021      Yes                      See            [facility]



     300 mg oral      0-05                               Instructio           l



     capsule      13:27:                               ns, TAKE 1           [facility]

[facility]



               00                                 CAPSULE BY           



                                                  MOUTH           



                                                  TWICE           



                                                  DAILY, #           



                                                  [id number]            



                                                  unknown           



                                                  unit, 3           



                                                  Refill(s),           



                                                  Pharmacy:           



                                                  [id number]           



                                                  MA[facility]           



                                                  SERVICE,           



                                                  [id number],           



                                                  cm,            



                                                  21           



                                                  9:31:00           



                                                  CDT,           



                                                  Height,           



                                                  96.364,           



                                                  kg,            



                                                  21           



                                                  9:31:00           



                                                  CDT,           



                                                  Weight           

 

     albuterol            Yes       [id number] 2{puff}      Inhale 2        

   [facility]



     90        6-16                               Puffs           [facility]



     mcg/actuati      00:00:                               every 6           [facility]

[facility]



     on inhaler      00                                 (six)           [facility]



                                                  hours as           [facility]



                                                  needed for           



                                                  Wheezing.           

 

     benzonatate            Yes       [id number] 100mg      Take 1          

 [facility]



     (TESSALON      6-16                               capsule by           ity 

of



     PERLES) 100      00:00:                               mouth 3           [facility]

[facility]



     mg capsule      00                                 (three)           [facility]

l



                                                  times           [facility]



                                                  daily.           

 

     albuterol      0      Yes       [id number] 2{puff}      Inhale 2        

   [facility]



     90        6-16                               Puffs           [facility]



     mcg/actuati      00:00:                               every 6           [facility]

[facility]



     on inhaler      00                                 (six)           [facility]



                                                  hours as           [facility]



                                                  needed for           



                                                  Wheezing.           

 

     benzonatate      0      Yes       [id number] 100mg      Take 1          

 [facility]



     (TESSALON      6-16                               capsule by           [facility] 

of



     PERLES) 100      00:00:                               mouth 3           [facility]

[facility]



     mg capsule      00                                 (three)           [facility]

l



                                                  times           [facility]



                                                  daily.           

 

     albuterol            Yes       [id number] 2{puff}      Inhale 2        

   [facility]



     90        6-16                               Puffs           [facility] of



     mcg/actuati      00:00:                               every 6           [facility]

[facility]



     on inhaler      00                                 (six)           [facility]



                                                  hours as           [facility]



                                                  needed for           



                                                  Wheezing.           

 

     benzonatate      0      Yes       [id number] 100mg      Take 1          

 [facility]



     (TESSALON      6-16                               capsule by           ity 

of



     PERLES) 100      00:00:                               mouth 3           [facility]

[facility]



     mg capsule      00                                 (three)           [facility]

l



                                                  times           [facility]



                                                  daily.           

 

     albuterol            Yes       [id number] 2{puff}      Inhale 2        

   [facility]



     90        6-16                               Puffs           ity of



     mcg/actuati      00:00:                               every 6           [facility]

[facility]



     on inhaler      00                                 (six)           [facility]



                                                  hours as           [facility]



                                                  needed for           



                                                  Wheezing.           

 

     benzonatate      0      Yes       [id number] 100mg      Take 1          

 [facility]



     (TESSALON      6-16                               capsule by           ity 

of



     PERLES) 100      00:00:                               mouth 3           [facility]

[facility]



     mg capsule      00                                 (three)           [facility]

l



                                                  times           [facility]



                                                  daily.           

 

     albuterol      0      Yes       [id number] 2{puff}      Inhale 2        

   [facility]



     90        6-16                               Puffs           [facility]



     mcg/actuati      00:00:                               every 6           [facility]

[facility]



     on inhaler      00                                 (six)           [facility]



                                                  hours as           [facility]



                                                  needed for           



                                                  Wheezing.           

 

     benzonatate      0      Yes       [id number] 100mg      Take 1          

 [facility]



     (TESSALON      6-16                               capsule by           [facility] 

of



     PERLES) 100      00:00:                               mouth 3           [facility]

[facility]



     mg capsule      00                                 (three)           [facility]

l



                                                  times           [facility]



                                                  daily.           

 

     albuterol      0      Yes       [id number] 2{puff}      Inhale 2        

   [facility]



     90        6-16                               Puffs           [facility]



     mcg/actuati      00:00:                               every 6           [facility]

[facility]



     on inhaler      00                                 (six)           [facility]



                                                  hours as           [facility]



                                                  needed for           



                                                  Wheezing.           

 

     benzonatate            Yes       [id number] 100mg      Take 1          

 [facility]



     (TESSALON      6-16                               capsule by           [facility] 

[facility]



     PERLES) 100      00:00:                               mouth 3           [facility]

[facility]



     mg capsule      00                                 (three)           [facility]

l



                                                  times           [facility]



                                                  daily.           

 

     albuterol            Yes       [id number] 2{puff}      Inhale 2        

   [facility]



     90        6-16                               Puffs           [facility]



     mcg/actuati      00:00:                               every 6           [facility]

[facility]



     on inhaler      00                                 (six)           [facility]



                                                  hours as           [facility]



                                                  needed for           



                                                  Wheezing.           

 

     benzonatate            Yes       [id number] 100mg      Take 1          

 [facility]



     (TESSALON      6-16                               capsule by           [facility] 

of



     PERLES) 100      00:00:                               mouth 3           [facility]

[facility]



     mg capsule      00                                 (three)           [facility]

l



                                                  times           [facility]



                                                  daily.           

 

     albuterol            Yes       [id number] 2{puff}      Inhale 2        

   [facility]



     90        6-16                               Puffs           [facility]



     mcg/actuati      00:00:                               every 6           [facility]

[facility]



     on inhaler      00                                 (six)           [facility]



                                                  hours as           [facility]



                                                  needed for           



                                                  Wheezing.           

 

     benzonatate            Yes       [id number] 100mg      Take 1          

 [facility]



     (TESSALON      6-16                               capsule by           [facility] 

of



     PERLES) 100      00:00:                               mouth 3           [facility]

[facility]



     mg capsule      00                                 (three)           [facility]

l



                                                  times           [facility]



                                                  daily.           

 

     benzonatate      2022- No             100mg      Take 100           

[facility]



     (Tessalon)      6-16 05-05                          mg by           [facility]



     100 MG      00:00: 00:00                          mouth.           



     capsule      00   :00                                          

 

     albuterol      2022- No                                      [facility]



     108 (90      6-16 05-05                                         [facility]



     Base)      00:00: 00:00                                         



     MCG/ACT      00   :00                                          



     inhaler                                                        

 

     doxycycline      2021- No        [id number] 100mg      Take 1         

  [facility]



     hyclate 100      6-16 06-24                          tablet by           [facility]

[facility]



     mg tablet      00:00: 04:59                          mouth 2           [place]

[facility]



               00   :00                           (two)           [facility]



                                                  times           [facility]



                                                  daily for           



                                                  7 days.           

 

     doxycycline      0 - No        [id number] 100mg      Take 1         

  [facility]



     hyclate 100      6-16 06-24                          tablet by           [facility]

[facility]



     mg tablet      00:00: 04:59                          mouth 2           [facility]

[facility]



               00   :00                           (two)           [facility]



                                                  times           [facility]



                                                  daily for           



                                                  7 days.           

 

     citalopram      2021-0      Yes                                     [facility]



     20 mg      5-26                                              [facility]



     tablet      00:00:                                              [place]



                                                               [facility]

 

     lisinopriL      -0      Yes                                     [facility]



     20 mg      5-26                                              ity of



     tablet      00:00:                                              [address]

 

     lovastatin      -0      Yes                                     [facility]



     20 mg      5-26                                              ity of



     tablet      00:00:                                              [address]

 

     omeprazole      -0      Yes                                     [facility]



     20 mg      5-26                                              ity of



     capsule      00:00:                                              [address]

 

     tamsulosin      -0      Yes                                     [facility]



     0.4 mg 24      5-26                                              ity of



     hr capsule      00:00:                                              [address]

 

     citalopram      -0      Yes                                     [facility]



     20 mg      5-26                                              ity of



     tablet      00:00:                                              [address]

 

     lisinopriL      -0      Yes                                     [facility]



     20 mg      5-26                                              ity of



     tablet      00:00:                                              [address]

 

     lovastatin      -0      Yes                                     [facility]



     20 mg      5-26                                              ity of



     tablet      00:00:                                              [address]

 

     omeprazole      -0      Yes                                     [facility]



     20 mg      5-26                                              ity of



     capsule      00:00:                                              [address]

 

     tamsulosin      0      Yes                                     [facility]



     0.4 mg 24      5-26                                              ity of



     hr capsule      00:00:                                              [address]

 

     citalopram      -0      Yes                                     [facility]



     20 mg      5-26                                              ity of



     tablet      00:00:                                              [address]

 

     lisinopriL      -0      Yes                                     [facility]



     20 mg      5-26                                              ity of



     tablet      00:00:                                              [address]

 

     lovastatin      -0      Yes                                     [facility]



     20 mg      5-26                                              ity of



     tablet      00:00:                                              [address]

 

     omeprazole      -0      Yes                                     [facility]



     20 mg      5-26                                              ity of



     capsule      00:00:                                              [address]

 

     tamsulosin      -0      Yes                                     [facility]



     0.4 mg 24      5-26                                              ity of



     hr capsule      00:00:                                              [address]

 

     citalopram      -0      Yes                                     [facility]



     20 mg      5-26                                              ity of



     tablet      00:00:                                              [address]

 

     lisinopriL      -0      Yes                                     [facility]



     20 mg      5-26                                              ity of



     tablet      00:00:                                              [address]

 

     lovastatin      -0      Yes                                     [facility]



     20 mg      5-26                                              ity of



     tablet      00:00:                                              [address]

 

     omeprazole      -0      Yes                                     [facility]



     20 mg      5-26                                              ity of



     capsule      00:00:                                              [address]

 

     tamsulosin      0      Yes                                     [facility]



     0.4 mg 24      5-26                                              ity of



     hr capsule      00:00:                                              [place]



                                                               [facility]



                                                                 [facility]

 

     citalopram      -0      Yes                                     [facility]



     20 mg      5-26                                              ity of



     tablet      00:00:                                              [address]



                                                                 [facility]

 

     lisinopriL      -0      Yes                                     [facility]



     20 mg      5-26                                              ity of



     tablet      00:00:                                              [address]



                                                                 Branch

 

     lovastatin      -0      Yes                                     [facility]



     20 mg      5-26                                              ity of



     tablet      00:00:                                              [address]



                                                                 [facility]

 

     omeprazole      -0      Yes                                     [facility]



     20 mg      5-26                                              ity of



     capsule      00:00:                                              [address]

 

     tamsulosin      0      Yes                                     [facility]



     0.4 mg 24      5-26                                              ity of



     hr capsule      00:00:                                              [address]

 

     citalopram      -0      Yes                                     [facility]



     20 mg      5-26                                              ity of



     tablet      00:00:                                              [address]

 

     lisinopriL      -0      Yes                                     [facility]



     20 mg      5-26                                              ity of



     tablet      00:00:                                              [address]

 

     lovastatin      -0      Yes                                     [facility]



     20 mg      5-26                                              ity of



     tablet      00:00:                                              [address]

 

     omeprazole      -0      Yes                                     [facility]



     20 mg      5-26                                              ity of



     capsule      00:00:                                              [address]

 

     tamsulosin      -0      Yes                                     [facility]



     0.4 mg 24      5-26                                              ity of



     hr capsule      00:00:                                              [address]

 

     citalopram      -0      Yes                                     [facility]



     20 mg      5-26                                              ity of



     tablet      00:00:                                              [address]

 

     lisinopriL      -0      Yes                                     [facility]



     20 mg      5-26                                              ity of



     tablet      00:00:                                              [address]

 

     lovastatin      -0      Yes                                     [facility]



     20 mg      5-26                                              ity of



     tablet      00:00:                                              [address]

 

     omeprazole      -0      Yes                                     [facility]



     20 mg      5-26                                              ity of



     capsule      00:00:                                              [address]

 

     tamsulosin      -0      Yes                                     [facility]



     0.4 mg 24      5-26                                              ity of



     hr capsule      00:00:                                              [address]

 

     citalopram      -0      Yes                                     [facility]



     20 mg      5-26                                              ity of



     tablet      00:00:                                              [address]

 

     lisinopriL      -0      Yes                                     [facility]



     20 mg      5-26                                              ity of



     tablet      00:00:                                              [address]

 

     lovastatin      -0      Yes                                     [facility]



     20 mg      5-26                                              ity of



     tablet      00:00:                                              [address]

 

     omeprazole      2021-0      Yes                                     [facility]



     20 mg      5-26                                              ity of



     capsule      00:00:                                              [address]



                                                                 Branch

 

     tamsulosin      2021-0      Yes                                     [facility]



     0.4 mg 24      5-26                                              ity of



     hr capsule      00:00:                                              [address]

 

     gabapentin      2021-0      Yes                                     [facility]



     300 mg      5-23                                              ity of



     capsule      00:00:                                              [address]

 

     gabapentin      2021-0      Yes                                     [facility]



     300 mg      5-23                                              ity of



     capsule      00:00:                                              [address]

 

     gabapentin      2021-0      Yes                                     [facility]



     300 mg      5-23                                              ity of



     capsule      00:00:                                              [address]

 

     gabapentin      2021-0      Yes                                     [facility]



     300 mg      5-23                                              ity of



     capsule      00:00:                                              [address]

 

     gabapentin      2021-0      Yes                                     [facility]



     300 mg      5-23                                              ity of



     capsule      00:00:                                              [address]

 

     gabapentin      2021-0      Yes                                     [facility]



     300 mg      5-23                                              ity of



     capsule      00:00:                                              [address]

 

     gabapentin      2021-0      Yes                                     [facility]



     300 mg      5-23                                              ity of



     capsule      00:00:                                              [address]

 

     gabapentin      2021-0      Yes                                     [facility]



     300 mg      5-23                                              ity of



     capsule      00:00:                                              [address]

 

     methocarbam      2020-0      Yes                      500 mg,           [facility]

[facility]



     ol 500 mg      6-02                               PO,            l



     oral tablet      14:13:                               Bedtime, X           

[facility]



                                                90 day, #           



                                                  90 tab, 1           



                                                  Refill(s),           



                                                  Pharmacy:           



                                                  [id number]           



                                                  MAIL           



                                                  SERVICE           

 

     gabapentin      2020-0      Yes                      300 mg,           [facility]

manuela



     300 MG Oral      6-02                               PO, BID, #           l



     Capsule      14:13:                               180 cap, 1           [facility]

kvng



               00                                 Refill(s),           



                                                  Pharmacy:           



                                                  [facility]           



                                                  MAIL           



                                                  SERVICE           

 

     methocarbam      2020-0      Yes                      500 mg,           [facility]

[facility]



     ol 500 mg      6-02                               PO,            l



     oral tablet      14:13:                               Bedtime, X           

[facility]



               00                                 90 day, #           



                                                  90 tab, 1           



                                                  Refill(s),           



                                                  Pharmacy:           



                                                  [facility]           



                                                  MAIL           



                                                  SERVICE           

 

     gabapentin      2020-0      Yes                      300 mg,           [facility]

[facility]a



     300 MG Oral      6-02                               PO, BID, #           l



     Capsule      14:13:                               180 cap, 1           [facility]

kvng



               00                                 Refill(s),           



                                                  Pharmacy:           



                                                  [facility]           



                                                  MAIL           



                                                  SERVICE           

 

     gabapentin      2019-1      No                       300 mg,           [facility]

manuela



     300 MG Oral      0-09                               PO, BID, #           l



     Capsule      12:52:                               180 cap, 2           [facility]

kvng



               15                                 Refill(s),           



                                                  Pharmacy:           



                                                  [facility]           



                                                  MAIL           



                                                  SERVICE           

 

     gabapentin      2019-      No                       300 mg,           [facility]

manuela



     300 MG Oral      0-09                               PO, BID, #           l



     Capsule      12:52:                               180 cap, 2           [facility]

kvng



               15                                 Refill(s),           



                                                  Pharmacy:           



                                                  [facility]           



                                                  MAIL           



                                                  SERVICE           

 

     methocarbam      -      No                       500 mg,           [facility]

[facility]



     ol 500 mg      0-09                               PO,            l



     oral tablet      12:52:                               Bedtime, X           

Shmuel



               07                                 90 day, #           



                                                  90 tab, 2           



                                                  Refill(s),           



                                                  Pharmacy:           



                                                  [facility]           



                                                  MAIL           



                                                  SERVICE           

 

     methocarbam      -      No                       500 mg,           [facility]

[facility]



     ol 500 mg      0-09                               PO,            l



     oral tablet      12:52:                               Bedtime, X           

Shmuel



               07                                 90 day, #           



                                                  90 tab, 2           



                                                  Refill(s),           



                                                  Pharmacy:           



                                                  [facility]           



                                                  MAIL           



                                                  SERVICE           

 

     methocarbam      2019-0      No                       500 mg,           [facility]

[facility]



     ol 500 mg      9-06                               PO,            l



     oral tablet      16:02:                               Bedtime, X           

[facility]



               12                                 90 day, #           



                                                  90 tab, 2           



                                                  Refill(s),           



                                                  Pharmacy:           



                                                  [facility]           



                                                  MAIL           



                                                  SERVICE           

 

     methocarbam      -      No                       500 mg,           [facility]

[facility]



     ol 500 mg      9-06                               PO,            l



     oral tablet      16:02:                               Bedtime, X           

[facility]



               12                                 90 day, #           



                                                  90 tab, 2           



                                                  Refill(s),           



                                                  Pharmacy:           



                                                  [facility]           



                                                  MAIL           



                                                  SERVICE           

 

     gabapentin      2019-0      No                       300 mg,           [facility]

[facility]a



     300 MG Oral      9-06                               PO, BID, X           l



     Capsule      16:01:                               90 day, #           [facility]i

nn



               59                                 180 cap, 2           



                                                  Refill(s),           



                                                  Pharmacy:           



                                                  [facility]           



                                                  MAIL           



                                                  SERVICE           

 

     gabapentin      2019-0      No                       300 mg,           [facility]

manuela



     300 MG Oral      9-06                               PO, BID, X           l



     Capsule      16:01:                               90 day, #           [facility]i

nn



               59                                 180 cap, 2           



                                                  Refill(s),           



                                                  Pharmacy:           



                                                  [facility]           



                                                  MAIL           



                                                  SERVICE           

 

     gabapentin      2019-0      No                       300 mg,           [facility]

manuela



     300 MG Oral      6-07                               PO, BID, X           l



     Capsule      14:04:                               30 day, #           [facility]i

nn



               00                                 60 cap, 8           



                                                  Refill(s),           



                                                  Pharmacy:           



                                                  [place] [id number]            

 

     methocarbam      2019-0      No                       500 mg,           [facility]

[facility]



     ol 500 mg      6-07                               PO,            l



     oral tablet      14:04:                               Bedtime, X           

Shmuel



               00                                 30 day, #           



                                                  30 tab, 3           



                                                  Refill(s),           



                                                  Pharmacy:           



                                                  [place] [id number]            

 

     gabapentin      2019-0      No                       300 mg,           [facility]

manuela



     300 MG Oral      6-07                               PO, BID, X           l



     Capsule      14:04:                               30 day, #           Mahi

nn



               00                                 60 cap, 8           



                                                  Refill(s),           



                                                  Pharmacy:           



                                                  [place] [id number]            

 

     methocarbam      2019-0      No                       500 mg,           [facility]

[facility]



     ol 500 mg      6-07                               PO,            l



     oral tablet      14:04:                               Bedtime, X           

[facility]



               00                                 30 day, #           



                                                  30 tab, 3           



                                                  Refill(s),           



                                                  Pharmacy:           



                                                  [place] [id number]            

 

     tamsulosin      2019-0      Yes                      0.4 mg,           [facility]

manuela



               5-30                               PO, Daily,           l



               14:15:                               0                                               Refill(s)           

 

     tamsulosin      2019-0      Yes                      0.4 mg,           [facility]

manuela



               5-30                               PO, Daily,           l



               14:15:                               0                                               Refill(s)           

 

     tamsulosin      2019-0      Yes                      0.4 mg,           [facility]

manuela



               5-30                               PO, Daily,           l



               14:15:                               0                                               Refill(s)           

 

     Methocarbam      2019-0      Yes                      500 mg,           [facility]

[facility]



     ol        5-30                               PO, PRN           l



               14:08:                                                                                              

 

     gabapentin      2019-0      Yes                      300 mg,           [facility]

manuela



               5-30                               PO, PRN, 0           l



               14:08:                               Refill(s)                                                           

 

     Methocarbam      2019-0      Yes                      500 mg,           [facility]

[facility]



     ol        5-30                               PO, PRN           l



               14:08:                                                                                              

 

     gabapentin      2019-0      Yes                      300 mg,           [facility]

manuela



               5-30                               PO, PRN, 0           l



               14:08:                               Refill(s)                                                           

 

     Lisinopril Lisinopril 2019-0      Yes            1    QD   TAKE 1          

 [facility]



     20 MG Oral 20 MG Oral 2-12                               TABLET           P

[facility]ysici



     Tablet Tablet 00:00:                               DAILY                                                           

 

     lisinopril      -0      Yes                      20 mg = 1           Me

moria



     20 mg oral      5-30                               tab, PO,           l



     tablet      14:12:                               Daily, #           [facility]                                 30 tab, 0           



                                                  Refill(s)           

 

     citalopram      -0      Yes                      20 mg = 1           [facility]

moria



     20 mg oral      5-30                               tab, PO,           l



     tablet      14:12:                               Daily, #           [facility]



               00                                 30 tab, 0           



                                                  Refill(s)           

 

     omeprazole            Yes                      20 mg = 1           [facility]

moria



     20 mg oral      1-30                               cap, PO,           l



     delayed      20:32:                               Daily, #           [place]

n



     release      00                                 30 cap, 0           



     capsule                                         Refill(s)           

 

     hydrochloro      2015-0      Yes                      1 tab, PO,           

[facility]



     thiazide-li      1-30                               Daily, #           l



     sinopril 25      20:31:                               30 tab, 0           H

[facility]



     mg-20 mg      00                                 Refill(s)           



     oral tablet                                                        

 

     albuterol albuterol           No                       albuterol           

[facility]



     sulfate HFA sulfate HFA                                    sulfate         

  [facility]



     90   90                                      HFA 90           [facility]



     mcg/actuati mcg/actuati                                    mcg/actuat      

     



     on aerosol on aerosol                                    ion            



     inhaler inhaler                                    aerosol           



                                                  inhaler           

 

     citalopram citalopram           No                       citalopram        

   [place]



     20 mg 20 mg                                    20 mg           [facility]



     tablet tablet                                    tablet           [facility]

 

     doxycycline doxycycline           No                       doxycyclin      

     [place]



     hyclate 100 hyclate 100                                    e hyclate       

    [facility]



     mg tablet mg tablet                                    100 mg           [facility]

[facility]



                                                  tablet           

 

     gabapentin gabapentin           No                       gabapentin        

   [place]



     300 mg 300 mg                                    300 mg           [facility]



     capsule capsule                                    capsule           [facility]

y

 

     lisinopril lisinopril           No                       lisinopril        

   [facility]



     20 mg 20 mg                                    20 mg           [facility]



     tablet tablet                                    tablet           [facility]

 

     lovastatin lovastatin           No                       lovastatin        

   [place]



     20 mg 20 mg                                    20 mg           [facility]



     tablet tablet                                    tablet           [facility]

 

     omeprazole omeprazole           No                       omeprazole        

   [place]



     20 mg 20 mg                                    20 mg           [facility]



     capsule,del capsule,del                                    capsule,de      

     [facility]



     ayed ayed                                    layed           



     release release                                    release           

 

     tamsulosin tamsulosin           No                       tamsulosin        

   [place]



     0.4 mg 0.4 mg                                    0.4 mg           [facility]



     capsule capsule                                    capsule           [facility]

y







Immunizations







           Ordered Immunization Filled Immunization Date       Status     Commen

ts   Source



           Name       Name                                        

 

           Influenza,            2022 Completed             [facility]



           injectable,            00:00:00                         



           quadrivalent                                             

 

           Influenza,            2022 Completed             [facility]



           injectable,            00:00:00                         



           quadrivalent                                             

 

           Influenza,            2022 Completed             [facility]



           injectable,            00:00:00                         



           quadrivalent                                             

 

           Influenza,            2022 Completed             [facility]



           injectable,            00:00:00                         



           quadrivalent                                             

 

           Influenza,            2022 Completed             [facility]



           injectable,            00:00:00                         



           quadrivalent                                             

 

           Influenza,            2022 Completed             [facility]



           injectable,            00:00:00                         



           quadrivalent                                             

 

           Influenza,            2022 Completed             [facility]



           injectable,            00:00:00                         



           quadrivalent                                             

 

           Influenza,            2022 Completed             [facility]



           injectable,            00:00:00                         



           quadrivalent                                             

 

           COVID-19, mRNA, COVID-19, mRNA, 2021 Completed             [facility]

[facility]



           LNP-S, PF, 100 LNP-S, PF, 100 00:00:00                         [facility]

y



           mcg/0.5 mL dose mcg/0.5 mL dose                                  

 

           COVID-19 Moderna 18            2021 Completed             [facility]



           [facility] Vaccination            00:00:00                         

 

           COVID-19 Moderna 18            2021 Completed             [facility]



           [facility] Vaccination            00:00:00                         

 

           COVID-19 Moderna 18            2021 Completed             [facility]



           [facility] Vaccination            00:00:00                         

 

           COVID-19 Moderna 18            2021 Completed             [facility]



           [facility] Over Vaccination            00:00:00                         

 

           COVID-19 Moderna 12            2021 Completed             [facility]



           [facility] Over Vaccination            00:00:00                         



           ()                                              

 

           COVID-19 Moderna 12            2021 Completed             [facility]



           [facility] Over Vaccination            00:00:00                         



           ()                                              

 

           COVID-19 Moderna 12            2021 Completed             [facility]



           [facility] Over Vaccination            00:00:00                         



           ()                                              

 

           COVID-19 Moderna            2021 Completed             [facility]

[facility]



           Primary 12+yr (red)            00:00:00                         

 

           SARS-CoV-2COVID-19mR            2021 Completed             [facility]

[facility]



           [id number]            19:07:55                         [facility]

kvng



           <sup>1, 2</sup>                                             

 

           SARS-CoV-2COVID-19mR            2021 Completed             [facility]

[facility]



           [id number]            19:07:55                         [facility]

kv[facility]



           <sup>1, 2</sup>                                             

 

           SARS-CoV-2COVID-19mR            2021 Completed             [facility]

[facility]



           [id number]            00:00:00                         [facility]

[facility]

 

           SARS-CoV-2COVID-19mR            2021 Completed             [facility]

[facility]



           [id number]            00:00:00                         [facility]

[facility]

 

           SARS-CoV-2COVID-19mR            2021 Completed             [facility]

[facility]



           [id number]            00:00:00                         [facility]

kv[facility]

 

           SARS-CoV-2COVID-19mR            2021 Completed             [facility]

[facility]



           [id number]            00:00:00                         [facility] calderon







Vital Signs







             Vital Name   Observation Time Observation Value Comments     Source

 

             Systolic blood 2023   134 mm[Hg]                [facility]



             pressure     18:50:00                               [facility]

 

             Diastolic blood 2023   86 mm[Hg]                 [facility]

[facility]



             pressure     18:50:00                               [facility]

 

             Heart rate   2023   81 /min                   [facility]



                          18:50:00                               [facility]

 

             Body temperature 2023   36.61 Fannie                 [facility]



                          18:50:00                               [facility]

 

             Respiratory rate 2023   20 /min                   [facility]



                          18:50:00                               [facility]

 

             Body height  2023   175.3 cm                  [facility]



                          18:50:00                               [facility]

 

             Body weight  2023   100.046 kg                [facility]



                          18:50:00                               [facility]

 

             BMI          2023   32.57 kg/m2               [facility]



                          18:50:00                               [facility]

 

             Oxygen saturation 2023   98 /min                   [facility]



             in Arterial blood 18:50:00                               [facility]

[facility]



             by Pulse oximetry                                        [facility]

 

             Systolic blood 2023   159 mm[Hg]                [facility]



             pressure     14:40:00                               [facility]

 

             Diastolic blood 2023   90 mm[Hg]                 [facility]

f



             pressure     14:40:00                               [facility]

 

             Heart rate   2023   68 /min                   [facility]



                          14:40:00                               [facility]

 

             Body temperature 2023   36.61 Fannie                 [facility]



                          14:40:00                               [facility]

 

             Respiratory rate 2023   18 /min                   [facility]



                          14:40:00                               [facility]

 

             Body height  2023   175.3 cm                  [facility]



                          14:40:00                               [facility]

 

             Body weight  2023   97.58 kg                  [facility]



                          14:40:00                               [facility]

 

             BMI          2023   31.77 kg/m2               [facility]



                          14:40:00                               [facility]

 

             Oxygen saturation 2023   98 /min                   [facility]



             in Arterial blood 14:40:00                               [facility]

[facility]



             by Pulse oximetry                                        [facility]

 

             Systolic blood 2022   126 mm[Hg]                [facility]



             pressure     13:51:00                               

 

             Diastolic blood 2022   77 mm[Hg]                 [facility]



             pressure     13:51:00                               

 

             Heart rate   2022   66 /min                   [facility]



                          13:51:00                               

 

             Body temperature 2022   36.44 Fannie                 [facility]



                          13:51:00                               

 

             Body weight  2022   96.435 kg                 [facility]



                          13:51:00                               

 

             BMI          2022   30.95 kg/m2               [facility]



                          13:51:00                               

 

             Systolic blood 2022   128 mm[Hg]                [facility]



             pressure     18:51:00                               

 

             Diastolic blood 2022   84 mm[Hg]                 [facility]



             pressure     18:51:00                               

 

             Heart rate   2022   74 /min                   [facility]



                          18:51:00                               

 

             Body temperature 2022   36.22 Fannie                 [facility]



                          18:51:00                               

 

             Body weight  2022   96.163 kg                 [facility]



                          18:51:00                               

 

             BMI          2022   30.86 kg/m2               [facility]



                          18:51:00                               

 

             Oxygen saturation 2022   96 /min                   [facility]



             in Arterial blood 18:51:00                               



             by Pulse oximetry                                        

 

             BP Diastolic 2021   78 mm[Hg]                 [facility]



                          00:00:00                               [facility]

 

             Height       2021   69 [in_i]                 [facility]



                          00:00:00                               [facility]

 

             BMI (Body Mass 2021   31 kg/m2                  [facility]



             Index)       00:00:00                               [facility]

 

             BP Systolic  2021   145 mm[Hg]                [facility]



                          00:00:00                               [facility]

 

             Body Weight  2021   210 [lb_av]               [facility]



                          00:00:00                               [facility]

 

             Systolic blood 2021   127 mm[Hg]                [facility]



             pressure     15:55:00                               [facility]

 

             Diastolic blood 2021   72 mm[Hg]                 [facility]





             pressure     15:55:00                               [facility]

 

             Heart rate   2021   83 /min                   [facility]



                          15:54:00                               [facility]

 

             Body temperature 2021   36.72 Fannie                 [facility]



                          15:54:00                               [facility]

 

             Respiratory rate 2021   18 /min                   [facility]



                          15:54:00                               [facility]

 

             Body height  2021   176.5 cm                  [facility]



                          15:54:00                               [facility]

 

             Body weight  2021   96.616 kg                 [facility]



                          15:54:00                               [facility]

 

             BMI          2021   31.00 kg/m2               [facility]



                          15:54:00                               [facility]

 

             Oxygen saturation 2021   96 /min                   [facility]



             in Arterial blood 15:54:00                               [facility]

[facility]



             by Pulse oximetry                                        [facility]

 

             Systolic (mm Hg) 2023                             [facility]

[facility]



                          14:14:00                               

 

             Diastolic (mm Hg) 2023                             [facility]

[facility]



                          14:14:00                               

 

             Heart Rate   2023                             [facility]

n



                          14:14:00                               

 

             Height       2023   5 [ft_i]                  [facility]

n



                          14:14:00                               

 

             Weight       2023                             [facility]

n



                          14:14:00                               

 

             BMI Calculated 2023                             [facility]

[facility]



                          14:14:00                               

 

             Systolic (mm Hg) 2022                             [facility]

[facility]



                          13:55:00                               

 

             Diastolic (mm Hg) 2022                             [facility]

[facility]



                          13:55:00                               

 

             Heart Rate   2022                             [facility]

n



                          13:55:00                               

 

             Respitory Rate 2022                             [facility]

[facility]



                          13:55:00                               

 

             Height       2022   175.26 cm                 [facility]

n



                          13:55:00                               

 

             Weight       2022                             [facility]

n



                          13:55:00                               

 

             BMI Calculated 2022                             [facility]

[facility]



                          13:55:00                               

 

             Systolic (mm Hg) 2021                             [facility]

[facility]



                          14:18:00                               

 

             Diastolic (mm Hg) 2021                             [facility]

[facility]



                          14:18:00                               

 

             Heart Rate   2021                             [facility]

n



                          14:18:00                               

 

             Respitory Rate 2021                             [facility]

[facility]



                          14:18:00                               

 

             Height       2021   175.26 cm                 [facility]

n



                          14:18:00                               

 

             Weight       2021                             [facility]

n



                          14:18:00                               

 

             BMI Calculated 2021                             [facility]

[facility]



                          14:18:00                               

 

             Systolic (mm Hg) 2020                             [facility]

[facility]



                          14:01:00                               

 

             Diastolic (mm Hg) 2020                             [facility]

[facility]



                          14:01:00                               

 

             Heart Rate   2020                             [facility]

n



                          14:01:00                               

 

             Respitory Rate 2020                             [facility]

[facility]



                          14:01:00                               

 

             Height       2020   177.8 cm                  [facility]

n



                          14:01:00                               

 

             Weight       2020                             [facility]

n



                          14:01:00                               

 

             BMI Calculated 2020                             [facility]

[facility]



                          14:01:00                               

 

             BP Systolic  2019   142 mm[Hg]   Location: RUE; [facility]



                          14:41:00                  Position:    



                                                    Sitting      

 

             BP Diastolic 2019   81 mm[Hg]    Location: RUE; [facility]



                          14:41:00                  Position:    



                                                    Sitting      

 

             Height       2019   69.5 [in_us]              [facility]



                          14:41:00                               

 

             Weight       2019   214.125 [lb_av]              [facility]

s



                          14:41:00                               

 

             Body Mass Index 2019   31.17 kg/m2               [facility]

s



             Calculated   14:41:00                               

 

             Temperature  2019   98.1 [degF]  Method: Oral [facility]



                          14:41:00                               

 

             Heart Rate   2019   69 /min      Location: R  [facility]



                          14:41:00                  Brachial     



                                                    Artery;      

 

             O2 SAT       2019   99 %         Source: RA   [facility]



                          14:41:00                               

 

             BP Systolic  2019   136 mm[Hg]   Location: RUE; [facility]



                          14:30:00                  Position:    



                                                    Sitting      

 

             BP Diastolic 2019   73 mm[Hg]    Location: RUE; [facility]



                          14:30:00                  Position:    



                                                    Sitting      

 

             Height       2019   69.5 [in_us]              [facility]



                          14:30:00                               

 

             Weight       2019   215.375 [lb_av]              [facility]

s



                          14:30:00                               

 

             Body Mass Index 2019   31.35 kg/m2               [facility]

s



             Calculated   14:30:00                               

 

             Temperature  2019   98.3 [degF]  Method: Oral [facility]



                          14:30:00                               

 

             Heart Rate   2019   85 /min      Location: R  [facility]



                          14:30:00                  Brachial     



                                                    Artery;      

 

             O2 SAT       2019   97 %         Source: RA   [facility]



                          14:30:00                               

 

             Systolic (mm Hg) 2019                             [facility]

[facility]



                          13:58:00                               

 

             Diastolic (mm Hg) 2019                             [facility]

ermann



                          13:58:00                               

 

             Heart Rate   2019                             [facility]

n



                          13:58:00                               

 

             Weight       2019                             [facility]

n



                          13:58:00                               

 

             Height       2019   175.26 cm                 [facility]

n



                          13:58:00                               

 

             Respitory Rate 2019                             [facility]

[facility]



                          13:58:00                               

 

             BMI Calculated 2019                             [facility]



                          13:58:00                               

 

             BP Systolic  2019   118 mm[Hg]   Location: RUE; [facility]



                          15:20:00                  Position:    



                                                    Sitting      

 

             BP Diastolic 2019   70 mm[Hg]    Location: RUE; [facility]



                          15:20:00                  Position:    



                                                    Sitting      

 

             Height       2019   69.5 [in_us]              [facility]



                          15:20:00                               

 

             Weight       2019   212.25 [lb_av]              [facility]



                          15:20:00                               

 

             Body Mass Index 2019   30.89 kg/m2               [facility]

s



             Calculated   15:20:00                               

 

             Temperature  2019   98.5 [degF]  Method: Oral [facility]



                          15:20:00                               

 

             Heart Rate   2019   73 /min      Location: R  [facility]



                          15:20:00                  Brachial     



                                                    Artery;      

 

             O2 SAT       2019   97 %         Source: RA   [facility]



                          15:20:00                               

 

             BP Systolic  2019   144 mm[Hg]   Location: RUE; [facility]



                          14:20:00                  Position:    



                                                    Sitting      

 

             BP Diastolic 2019   80 mm[Hg]    Location: RUE; [facility]



                          14:20:00                  Position:    



                                                    Sitting      

 

             Height       2019   69.5 [in_us]              [facility]



                          14:20:00                               

 

             Weight       2019   211 [lb_av]               [facility]



                          14:20:00                               

 

             Body Mass Index 2019   30.71 kg/m2               [facility]

s



             Calculated   14:20:00                               

 

             Temperature  2019   98.3 [degF]  Method: Oral [facility]



                          14:20:00                               

 

             Heart Rate   2019   74 /min      Location: R  [facility]



                          14:20:00                  Brachial     



                                                    Artery;      

 

             O2 SAT       2019   98 %         Source: RA   [facility]



                          14:20:00                               

 

             BP Systolic  2017   126 mm[Hg]   Location: LUE; [facility]



                          08:01:00                  Position:    



                                                    Sitting      

 

             BP Diastolic 2017   77 mm[Hg]    Location: LUE; [facility]



                          08:01:00                  Position:    



                                                    Sitting      

 

             Height       2017   69.5 [in_us]              [facility]



                          08:01:00                               

 

             Weight       2017   214 [lb_av]               [facility]



                          08:01:00                               

 

             Body Mass Index 2017   31.15 kg/m2               [facility]

s



             Calculated   08:01:00                               

 

             Temperature  2017   97.5 [degF]  Method: Oral [facility]



                          08:01:00                               

 

             Heart Rate   2017   75 /min                   [facility]



                          08:01:00                               

 

             Respiration Rate 2017   14 /min                   [facility]

[facility]



                          08:01:00                               







Procedures







                Procedure       Date / Time     Performing Clinician Source



                                Performed                       

 

                XR CHEST 2 VW   2023 19:09:42 Navarro [facility] o

f [facility]

 

                OCT, RETINA - OU - BOTH 2022 [facility]34[facility]



                E[facility]                                            

 

                OCT, RETINA - O[facility] - BOTH [facility]37[facility]



                [facility]                                            

 

                FUNDUS PHOTOS - OU - 2022 13:37:41 [facility]



                BOTH EYES                                       

 

                PTOSIS VISUAL FIELD, 2021 14:40:37 [facility]

[facility]



                LIMITED - OU - BOTH EYES                 [facility] 

 

                CT, abdomen + pelvis, 2021 00:00:00                 [place]

[facility]



                w/wo contrast                                   [facility]

 

                XR CHEST 2 VW   2021 17:31:02 Fe Funes [facility]

 

                COVID-19 (MOLECULAR 2021 16:45:00 Fe Funes [facility]



                 NUCLEIC ACID                                   



                AMPLIFICATION)                                  

 

                Polysomnography, sleep 2019 00:00:00                 [facility]

[facility]



                staging with 4+                                 



                parameters of sleep,                                 



                attended by a                                   



                technologist                                    

 

                [U] XRAY THORACOLUMBAR 2018 00:00:00                 [facility]

[facility]



                SPINE, SCOLIOSIS STUDY                                 



                (W/ SUPINE AND ERECT)                                 



                [id number]                                           

 

                Physical Therapy 2018 00:00:00                 [facility]

[facility]

 

                [UTP] EMG       2017 00:00:00                 [facility]

[facility]

 

                MRI Spine lumbar wo 2017 00:00:00                 [facility]

[facility]



                contrast [id number]                                  

 

                Diagnostic Colonoscopy 2015 00:00:00                 [place]

[facility]



                                                                [facility]

 

                Carpal Tunnel Surgery 2015 00:00:00                 [facility]

[facility]



                                                                [facility]

 

                Hernia Repair W/mesh 1991 00:00:00                 [facility]



                                                                Urology

 

                History of Carpal tunnel                                 [facility]

[facility]



                surgery                                         

 

                Repair of umbilical                                 [facility]

[facility]



                hernia                                          

 

                Colonoscopy                                     [facility]







Plan of Care







             Planned Activity Planned Date Details      Comments     Source

 

             Diagnostic Test 2021   cytology, urine              [facility]

[facility]



             Pending      00:00:00     [code = cytology,              [facility]



                                       urine]                    

 

             Diagnostic Test 2021   culture, urine +              [facility]



             Pending      00:00:00     sensitivity [code =              [facility]



                                       culture, urine +              



                                       sensitivity]              

 

             Diagnostic Test 2021   urinalysis, dipstick              [facility]



             Pending      00:00:00     [code = urinalysis,              [facility]



                                       dipstick]                 

 

             Diagnostic Test 2021   PSA, total + free,              [place]

[facility]



             Pending      00:00:00     serum or plasma              [facility]



                                       [code = PSA, total +              



                                       free, serum or              



                                       plasma]                   

 

             Diagnostic Test 2021   unlisted lab [code =              [facility]



             Pending      00:00:00     unlisted lab]              [facility]

 

             Diagnostic Test 2018   [UT[facility]] EMG [code =              [facility]

[facility]



             Pending      00:00:00     [UTP] EMG]                

 

             Diagnostic Test 2018   [UTP] EMG [code =              [facility]

[facility]



             Pending      00:00:00     [UTP] EMG]                







Encounters







        Start   End     Encounter Admission Attending Care    Care    Encounter 

Source



        Date/Time Date/Time Type    Type    Clinicians Facility Department ID   

   

 

        2023         Outpatient                 [facility]     [id number]

 [facility]



        09:08:51                                                 [id number] [facility]

 

        2023         Outpatient                 [facility]     [id number]

 [facility]



        17:10:52                                                 [id number] [facility]

 

        2022         Outpatient                 [facility]     [id number]

 [facility]



        08:34:10                                                 [id number] [facility]

 

        2022         Outpatient                 [facility]     [id number]

 [facility]



        19:05:21                                                 [id number] [facility]

 

        2022         Outpatient                 [facility]     [id number]

 [facility]



        09:23:21                                                 [id number] [facility]

 

        2022         Outpatient                 [facility]     [id number]

 [facility]



        09:46:43                                                 [id number] [facility]

 

        2022         Outpatient                 [facility]     [id number]

 [facility]



        13:51:16                                                 [id number] [facility]

 

        2021         Outpatient         LEONIE [facility]     [id number]

 [facility]



        09:30:23                         [facility] [facility]                           

 

        2021         Outpatient                 [facility]     [id number] 

[place]



        07:43:34                                                         Health

 

        2021         Outpatient         LEONIE, [facility]     [id number]

 [place]



        10:50:51                         MELISSAJESSY                         Emmanuel

[facility]



                                        ATOMI                           

 

        2021         Outpatient         FABY, [facility]     [id number]

 [place]



        08:23:17                         [facility]

 

        2024 Outpatient         RODRIGO, [facility]     1510

[address]69 [place]



        08:00:00 08:00:00                 ANGELICA Velazquez

h

 

        2024 Outpatient                 MHIE    MHIE    [id number]

665 [facility]



        09:00:00 09:00:00                                         07      l



                                                                        Shmuel

 

        2023 Outpatient R       NAVARRO [facility]    [id number]

7150 [facility]



        14:01:02 23:59:00                 [facility]                           [facility]

 

        2023 [facility]    1.2.600.170 3003

[address] [facility]



        14:01:02 23:59:00 Encounter         [facility]  350.1.13.10         

ity of



                                                [place] 4.2.7.2.686         Alejandro

as



                                                JULES?BLEA [phone]         [facility]



                                                REDDY    [address]



                                                MEDICAL                 



                                                OFFICE                  



                                                [facility]                 

 

        2023 Urgent          [facility]    1.2.840.114

 [id number] Univers



        14:00:00 14:12:13 Care            Unknown, [facility]  350.1.13.10

         ity of



                                                [place] 4.2.7.2.686         Alejandro

as



                                                JULES?BLEA [phone]         [facility]

[facility]    [address]             [address]



                                                MEDICAL                 



                                                OFFICE                  



                                                BUILDING                 

 

        2023 Telephone         [facility]    1.2.840.114 [id number]

[id number] Univers



        00:00:00 00:00:00                 Rania   [employer]  350.1.13.10         it

y of



                                                [place] 4.2.7.2.686         Alejandro

as



                                                JULES?BLEA [phone]         Me

[facility]    [address]             [address]



                                                MEDICAL                 



                                                OFFICE                  



                                                BUILDING                 

 

        2023 Urgent          Ameena Patterson [facility]    1.2.840.11

[id number] [facility]



        09:40:00 10:00:00 Care            Unknown, Attending HEALTH  350.1.13.10

         [facility] 4.2.7.2.686         Alejandro

as



                                                JULES?BLEA [phone]         [facility]



                                                [address]



                                                MEDICAL                 



                                                OFFICE                  



                                                [facility]                 

 

        2023 Outpatient FABIENNE PTATERSON [facility]    [id number]

58248 [facility]



        09:40:00 09:40:00                 REENU                           ity [facility]

 

        2023 Office          Rodrigo, [facility] 6400 1.2.840.114 14

[id number] [place]



        09:30:00 14:31:43 Visit           Angelica RICH  350.1.13.58        

 Health



                                                        9.2.7.2.686         



                                                        [phone]         



                                                        4               

 

        2023 Outpatient                 MHIE    MNA     [id number]

665 [facility]



        14:15:00 04:59:59                                 Neurology 06      matt Mi

 

        2023 Outpatient         Mari,  MHMISCHER MHMISCHER 610

[id number] 



        09:15:00 23:59:59                 Sanju                   06      



                                        Michele                         

 

        2023 Outpatient                 MHIE    MHIE    [id number]

665 [facility]



        09:15:00 09:15:00                                         06      matt Mi

 

        2023 Outpatient                 MHIE    MHIE    [id number]

665 [facility]



        09:15:00 09:15:00                                         06      matt Mi

 

        2022 Office          [place], [place] 6400 1.2.840.114 14

[id number] [place]



        09:30:00 13:07:13 Visit           ANGELICA RICH  350.1.13.58        

 Health



                                                        9.2.7.2.686         



                                                        [phone]         



                                                        4               

 

        2022 Outpatient                 UTH     UTH     [id number]

99 [place]



        09:20:00 12:57:22                                                 Health

 

        2022 Outpatient         RODRIGO, [facility]     1388

[id number] [place]



        08:15:00 08:15:00                 ANGELICA teixeira

 

        2022 Office          FABY, [facility] 6400 1.2.417.070 2305

[address]53 [place]



        10:00:00 15:34:00 Visit           TRISH RICH .1.13.58         

Health



                                                        9.2.7.2.686         



                                                        [phone]         



                                                        4               

 

        2022 Outpatient         ANTONY,  [facility]     [id number]

77 [place]



        13:00:00 13:00:00                 [facility]

 

        2022 Office          Duong, [facility]     1.2.840.114 [id number]

8182 [place]



        09:00:00 09:54:58 Visit           Stefan VASQUEZ  350.1.13.58         He

alth



                                                VILLAGE 9.2.7.2.686         



                                                MULTI   [phone]         



                                                SPECIALTY 2               

 

        2022 Telephone         [place]     1.2.151.440 1481

99925 [place]



        00:00:00 00:00:00                 Diego RINGA  350.1.13.58         He

alth



                                                VILLAGE 9.2.7.2.686         



                                                MULTI   [phone]         



                                                SPECIALTY 6               

 

        2022 Telephone         Luis   [facility]     1.2.931.839 6184

[address] [place]



        00:00:00 00:00:00                 Diego VASQUEZ  350.1.13.58         He

alth



                                                VILLAGE 9.2.7.2.686         



                                                MULTI   [phone]         



                                                SPECIALTY 6               

 

        2022 Office          Carinkandisjasmina, [facility] 6400 1.2.840.114 13

[id number] [place]



        08:00:00 09:10:19 Visit           Angelica TREVINO 350.1.13.58        

 Health



                                                        9.2.7.2.686         



                                                        [phone]         



                                                        4               

 

        2022 Outpatient                 nullFlavo [facility]NA     [id number]

[id number] [facility]



        14:00:00 04:59:59                         r       Neurology 05      l



                                                        [place]

 

        2022 Outpatient                 nullFlavo [facility]     [id number]

[id number] [facility]



        14:00:00 04:59:59                         r       Neurology 05      l



                                                        [place]

 

        2022 Outpatient         Mari  MHMISCHER MHMISCHER 610

[id number] 



        09:00:00 23:59:59                 Sanju Bautista                         

 

        2022 Outpatient                 MHIE    MHIE    [id number]

665 [facility]



        09:00:00 09:00:00                                         05      l



                                                                        Shmuel

 

        2022-05-10 2022 Outpt Diag                 nullFlavo [facility]    [id number]

72955 [place]



        19:26:00 04:59:00 Services                 r       Outpatient 00      l



                                                        Imaging         Shmuel [place]         

 

        2022-05-10 2022 Outpt Diag                 nullFlavo [facility]    [id number]

13522 [facility]



        19:26:00 04:59:00 Services                 r       Outpatient 00      l



                                                        Imaging         Shmuel



                                                        [place]         

 

        2022-05-10 2022-05-10 Outpatient         Luis,   MHOIP   MHOIP   [id number]

[id number] 



        14:26:00 23:59:00                 Diegoterri Tinoco                         

 

        2022 Office          [place],   [facility]     1.2.840.114 [id number]

848 [place]



        13:40:00 14:26:51 Visit           Diego VASQUEZ  350.1.13.58         [facility] 9.2.7.2.686         



                                                MULTI   [phone]         



                                                [facility] 6               

 

        2022 Office          Rodrigo, [facility] 6400 1.2.840.114 13

[id number] [place]



        09:00:00 10:17:03 Visit           Angelica RICH .1.13.58        

 Health



                                                        9.2.7.2.686         



                                                        [phone]         



                                                        4               

 

        2022 Office          Rodrigo, [facility] 6400 1.2.840.114 13

[id number] [place]



        08:00:00 08:15:00 Visit           Angelica RICH .1.13.58        

 Health



                                                        9.2.7.2.686         



                                                        [phone]         



                                                        4               

 

        2022 Office          Rodrigo, [facility] 6400 1.2.840.114 13

[id number] [place]



        09:45:00 15:56:50 Visit           Angelica RICH .1.13.58        

 Health



                                                        9.2.7.2.686         



                                                        [phone]         



                                                        4               

 

        2022 Outpatient R       MEMO, [facility]    [id number]

8854 Univers



        13:40:00 13:56:56                 MEHREEN castro o

f



                                                                        [facility]

 

        2022 Outpatient FABIENNE SUGGS   [facility]    [id number]

873 [facility]



        13:40:00 13:40:00                 SHANNON castro [facility]

 

        2021 Office          [place] 6400 1.2.447.286 0459

[address] [place]



        08:30:00 09:29:17 Visit           Alan TREVINO 350.1.13.58     

    Health



                                        atomi           9.2.7.2.686         



                                                        [phone]         



                                                        4               

 

        2021 Office          Leonie [place] 6400 1.2.232.095 5531

[address] [place]



        08:30:00 09:29:17 Visit           Alan RICH .1.13.58     

    Health



                                        atomi           9.2.7.2.686         



                                                        [phone]         



                                                        4               

 

        2021 Outpatient         Goldfarb[facility]D [facility]     [facility]     4577

 [place]



        12:41:00 12:41:00                                         35156   [place]



                                                                        Urology

 

        2021 Outpatient         Goldfarb_D [facility]     [facility]     4577

 [place]



        09:52:00 09:52:00                                         17852   [place]



                                                                        Urology

 

        2021 Outpatient         Goldfarb_D [facility]     [facility]     4577

 [place]



        10:45:00 10:45:00                                         24152   [place]



                                                                        Urology

 

        2021 Outpatient         Goldfarb_D [facility]     [facility]     4577

 [place]



        11:49:00 11:49:00                                         [address]75   [place]



                                                                        Urology

 

        2021 Outpatient         Goldfarb_D [facility]     [facility]     4577

 [place]



        10:58:00 10:58:00                                         78157   [place]



                                                                        Urology

 

        2021 Willam                   [facility]     [place] -    [id number] H

marcy



        00:00:00 00:00:00 Mike Crowley: 6560                         Urology SHASTA Rich                          - [address]          



                        [address]                                           



                        [address],                                           



                        [place], [place]                                              



                        [phone]                                         



                        , Ph.                                           



                        [phone]                                         

 

        2021 Outpatient         Shiv, U     U     3778a

568-3 



        00:00:00 00:00:00                 Willam Painting                 [id number]

 



                                                                [id number] 



                                                                [id number]  

 

        2021 Outpatient         Goldfarb_D U     [facility]     4577

93 [place]



        11:26:00 11:26:00                                         98247   [place]



                                                                        Urology

 

        2021 Outpatient         Goldfarb_D U     [facility]     4577

93 [place]



        10:11:00 10:11:00                                         99130   [place]



                                                                        Urologdoreen

 

        2021 [facility]         [facility]    1.2.840.114 [id number]

719 Univers



        11:45:00 23:59:00 Encounter         Fe Manriquez 350.1.13.10       

  ity [facility] 4.2.7.2.686         [facility]  [phone]         [address]

 

        2021 Urgent          Provider, [facility]    

1.2.840.114 

[id number]                                Univers



        10:21:38 11:55:32 Care            Fe Funes [facility]  350.1.13.10  

       [facility] 4.2.7.2.686         Alejandro

as



                                                Professio [phone]         [facility]     [address]             [address]



                                                Office                  



                                                Building                 



                                                One                     

 

        2021 Outpatient FABIENNE FUNES [facility]    [id number]

762 Univers



        10:20:00 10:20:00                 FE castro [facility]

 

        2021 Letter          Doctor ARAUJO    1.2.840.114 [id number]

70 Univers



        00:00:00 00:00:00 (Out)           Unassigned, LIMA   350.1.13.10       

  ity of



                                        [facility] 4.2.7.2.686         Alejandro

as



                                                        [phone]         [address]

 

        2021 Letter          Doctor ARAUJO    1.2.840.114 [id number]

71 Univers



        00:00:00 00:00:00 (Out)           Unassigned, LIMA   350.1.13.10       

  ity of



                                        [facility] 4.2.7.2.686         Alejandro

as



                                                        [phone]         [address]

 

        2021 Outpatient                 nullFlavo [facility]     [id number]

49034 [facility]



        14:00:00 04:59:59                         r       Neurology 04      l



                                                        [place]

 

        2021 Outpatient                 nullFlavo [facility]     [id number]

43211 [facility]



        14:00:00 04:59:59                         r       Neurology 04      l



                                                        [place]

 

        2021 Outpatient         CRAIG Guerra 610

[id number] 



        09:00:00 23:59:59                 Sanju                   04      



                                        Michele                         

 

        2021 Ambulatory                 nullFlavo [facility]     [id number]

93493 [place]



        14:00:00 14:00:00 Pre-Reg                 r       Neurology 03      l



                                                        [place]

 

        2021 Ambulatory                 nullFlavo [facility]     [id number]

54364 [facility]



        14:00:00 14:00:00 Pre-Reg                 r       Neurology 03      l



                                                        [place]

 

        2021 Outpatient                 MHIE    MHIE    [id number]

665 [facility]



        09:00:00 09:00:00                                         04      matt Mi

 

        2021 Outpatient                 MHIE    MHIE    [id number]

665 [facility]



        09:00:00 09:00:00                                         03      matt Mi

 

        2021 Outpatient         CRAIG Guerra 610

[id number] 



        09:00:00 09:00:00                 Sanju                   03      



                                        Michele                         

 

        2020 Outpatient                 nullFlavo [facility]NA     [id number]

20507 [facility]



        14:00:00 04:59:59                         r       Neurology 02      matt Mi

 

        2020 Outpatient                 nullFlavo MNA     [id number]

39822 [facility]



        14:00:00 04:59:59                         r       Neurology 02      l



                                                        [place]

 

        2020 Outpatient         Kreshan,  MHMISCHER MHMISCHER 610

[id number] 



        09:00:00 23:59:59                 Sanju                   02      



                                        Michele                         

 

        2020 Outpatient                 MHIE    MHIE    [id number]

665 [facility]



        09:00:00 09:00:00                                         02      matt Mi

 

        [facility]     [id number]

1 [place]



        13:00:00 13:00:00 t; DIEGO DILLARD                         Physi

NICHOLAS Kay M.D.                                            

 

        [facility]     [facility] [address]

[address] [place]



        14:40:00 14:40:00 t; DIEGO DILLARD lty -           Physi

NICHOLAS Kay M.D.                                            

 

        2019 [facility] 537

[address]48 [place]



        14:40:00 14:40:00 t; DIEGO DILLARD lty -           Physi

NICHOLAS Kay M.D.                                            

 

        2019 Outpatient                 nullFlavo MNA     [id number]

[id number] [facility]



        14:00:00 04:59:59                         r       Neurology 01      l



                                                        [place]

 

        2019 Outpatient                 nullFlavo MNA     [id number]

09132 [facility]



        14:00:00 04:59:59                         r       Neurology 01      l



                                                        [place]

 

        2019 Outpatient         Mari,  MHMISCHER MHMISCHER 610

[id number] 



        09:00:00 23:59:59                 Sanju                         



                                        Michele                         

 

        2019 Outpatient                 MHIE    MHIE    [id number]

665 [facility]



        09:00:00 09:00:00                                         01      matt Mi

 

        [facility]     [place]  [id number]

0 [place]



        15:40:00 15:40:00 t; DIEGO DILLARD         [facility]         Physi

NICHOLAS Kay M.D.                                            

 

        [facility]         [place]     [place]  [id number]

8 [place]



        14:20:00 14:20:00 t; DIEGO DILLARD [place]         [facility]



                        NICHOLAS GARCÍA M.D.                                            

 

        2018 AppointOH Ring     [facility]     [id number]

0 [place]



        11:00:00 11:00:00 t; NAJMA SHANNON,         Orthopedics    

     Miguel brandt M.D.                                          

 

        2018 Outpatient                 [facility]    [id number]

665 [facility]



        09:00:00 09:00:00                                         00      l



                                                                        Shmuel

 

        2018 Outpatient                 IE    IE    [id number]

665 [facility]



        09:00:00 09:00:00                                         00      l



                                                                        Shmuel

 

        [facility]         [place] SHANNON     [facility]     [id number]

1 [place]



        13:30:00 13:30:00 t; NAJMA SHANNON Physici SHAH-NAWAZ M.D. ans, M.D.                                          

 

        2018 [place] Mace     Neurology 37

[id number] [place]



        14:30:00 14:30:00 t;              NICHOLAS SAAVEDRA ans SUUR, M.D.                                         

 

        2017 [place] Mace     Neurology 33

[id number] [place]



        08:00:00 08:00:00 t;              NICHOLAS SAAVEDRA ans SUUR, M.D.                                         







Results







           Test Description Test Time  Test Comments Results    Result Comments 

Source









                                        COVID-19 (MOLECULAR TESTING 



                          2021 04:19:19       



                     NUCLEIC ACID AMPLIFICATION)                     









                      Test Item  Value      Reference Range Interpretation Comme

nts









             SARS-CoV-2 NAAT (test code = Not Detected Not Detected             

 



             71764-5)                                            

 

             CYNTHIA (test code = CYNTHIA) Hologic Aptima SARS-CoV-2 Assay              

             



                          is a nucleic acid amplification                       

    



                          test intended for the qualitative                     

      



                          detection of RNA from SARS-CoV-2                      

     



                          from nasopharyngeal (NP)                           



                          specimens. ?It is used under                          

 



                          Emergency Use Authorization (EUA)                     

      



                          by FDA. A positive result is                          

 



                          indicative of the presence of                         

  



                          SARS-CoV-2 RNA. ?Clinical                           



                          correlation with patient history                      

     



                          and other diagnostic information                      

     



                          is necessary to determine patient                     

      



                          infection status. A negative (Not                     

      



                          Detected) result does not                           



                          preclude SARS-CoV-2 infection.                        

   



                          ?Clinical correlation with                           



                          patient history and other                           



                          diagnostic information should be                      

     



                          used in patient management                           



                          decisions. Invalid: Unable to                         

  



                          generate a valid test result on                       

    



                          this specimen. ?Please submit a                       

    



                          new specimen for repeat testing                       

    



                          if clinically indicated.                           

 

             Lab Interpretation (test code = Normal                             

    



             47024-5)                                            



[id number] 17:42:55HISTORY: Cough
for 4 weeks. TECHNIQUE: 2 PA and one lateral views of the chest are obtained. No
priorchest study available for comparison. FINDINGS: Mild generalized 
obstructive lung disease is suspected. Smallcalcified granulomas seen in the 
right upper lung. Lateral view showed 9 mmnodule projected over one of the 
middle thoracic vertebral bodies. Minimalfibrosis noted in the lingula segment. 
No acute pneumonia detected. No pneumothorax or pleural effusion orpulmonary 
congestion. Cardiothoracic ratio of approximately 15.8/36.8 cm isconsistent with
normal cardiac size. Probable small hiatal hernia. CONCLUSIONS:1. No signs of 
acute cardiopulmonary disease.2. 9 mm soft tissue nodule is seen visibleonly in 
the lateral view,projected over one of the middle thoracic vertebral bodies. 
Exact etiologyis uncertain, therefore, noncontrast enhanced CT scan of the 
chestrequested.RUST, Radiant Results InftUser - 2021 12:43 PM 
CDTFormatting of this note might be different from the original.HISTORY: Cough 
for 4 weeks.TECHNIQUE: 2 PA and one lateral views of the chest are obtained. No 
priorchest study available for comparison.FINDINGS: Mild generalized obstructive
lung disease is suspected. Smallcalcified granulomas seen in the right upper 
lung. Lateral view showed 9 mmnodule projected over one of the middle thoracic 
vertebral bodies. Minimalfibrosis noted in the lingula segment.No acute 
pneumoniadetected. No pneumothorax or pleural effusion orpulmonary congestion. 
Cardiothoracic ratio of approximately 15.8/36.8 cm isconsistent with normal 
cardiac size. Probable small hiatal hernia.CONCLUSIONS:1. No signs of acute 
cardiopulmonary disease.2. 9 mm soft tissue nodule is seen visible only in the 
lateral view,projected over one of the middle thoracic vertebral bodies. Exact 
etiologyis uncertain, therefore, noncontrast enhanced CT scan of the 
chestrequested.[facility][U] XRAY THORACOLUMBAR SPINE, 
SCOLIOSIS STUDY (W/ SUPINE AND ERECT) [id number] 11:25:00Images acquired, 
not reported on this accession number.[facility] Spine lumbar wo contrast 
[id number] 10:47:00Exam: MRI lumbar spine without contrast.INDICATION: 
Radiculopathy. Left leg muscle atrophy, left foot numbness,bilateral leg 
cramps.COMPARISON: None.TECHNIQUE: Multiecho multiplanar MR sequences of the
lumbar spine are obtainedwithout gadolinium.Discussion: Lowest fully formed disc
 will be designated as L5-S1.Rotatory leftward curvature of the lumbar spine is 
present.T12-L1: Annular bulge without canal stenosis or nerve root 
compression.Right-sided foraminal stenosis encroaching upon the exiting nerve 
root.L1-L2: Right foraminal disc protrusion combined with advanced right-sided 
facetarthropathy causes severe right foraminal stenosis compressing the 
exitingnerve root. No canal stenosis. Mild left foraminal stenosis.L2-L3: 
Annular bulge without canal stenosis. Right lateral recess stenosisencroaching 
upon the descending right L3 nerve root. Bilateral facetarthropathy. Bilateral 
foraminal stenosisencroaching upon the exiting rightnerve roots.L3-L4: Disc is 
collapsed. Annular bulge and advanced facet arthropathy causesevere canal 
stenosis compressing the cauda equina. Bilateral foraminalstenosiswith 
compression of the exiting right L3 nerve root. Grade 1anterolisthesis of L3 on 
L4.L4-L5: Disc is decreased in height. Grade 1 anterolisthesis of L4 on 
L5.Annular bulge and bilateral facet arthropathy encroaching upon the 
descendingL5 nerve roots in the lateral recesses. No canal stenosis. Bilateral 
foraminalstenosis, severe on the left with compression of the exiting left L4 
nerveroot.L5-S1: Disc is decreased in height. Annular bulge and bilateral 
facetarthropathy encroach upon the descending S1 nerve roots in the 
lateralrecesses. No canal stenosis. Bilateral foraminal stenosis, severe on the
leftcompressing the exiting left L5 nerve root.IMPRESSION:Rotatory leftward 
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 
at L3-L4 with grade 1 degenerative anterolisthesis of L3on L4 and canal stenosis
 causing chronic compression of the cauda equina atthis level.Multilevel facet 
arthropathy and foraminal stenosis with neural impingement.--Read by: Eliane Wright MDDictated Date/time: 18 14:54Electronically Signed by: Eliane Wright MD 1815:[id number] REPORTUT PhysiciansTobacco Use Screening
2017 14:00:00





             Test Item    Value        Reference Range Interpretation Comments

 

             Completed (test code = Completed) DONE                             

      



[facility]



Notes







                Date/Time       Note            Provider        Source

 

                          2023 17:05:41-00:00 Formatting of this note migh

t be different from the 

original.                                           [facility]



                                                     Reviewed XRAY results with 

patient, he will need to follow up with PCP or 

pulmonary for his lung nodule. Likely will need CT chest. He states 
understanding and has no further questions at this time                         

  



                                                                



                                XR CHEST 2 VW                   



                                                                



                                Result Date: 2023                 



                                                    EXAM: XR CHEST 2 VW COMPARIS

ON: Chest x-ray on 2021 HISTORY: 73-year-old 

male with active smoking history presents for cough, SOB x 1 month FINDINGS: 
Lungs: The lungs are adequately expanded. Lobul                           



                                                    ated right hemidiaphragm. Gr

ossly unchanged appearance of the nodule. No focal 

opacity. No pleural abnormality. Heart/Mediastinum: Mild cardiomegaly. Calcified
 aortic arch. Bones and soft tissues: Osteopenia. Degenerative changes of the 
spine.                                              



                                                                



                                                    A similar in size nodule. Th

is can be further evaluated with a dedicated CT 

chest exam if patient is at increased risk for lung cancer has a smoking 
history. No radiographic evidence of acute cardiopulm                           



                                                    onary process. IAlba MD., have reviewed this study and agree with 

the above report.                                   



                                                                



                                                                



                                Electronically signed by Anjali Briceño FNP at 

2023 5:08 PM CDT                 

 

                          2022-05-10 14:57:00-00:00 Radiation Dose CTDIVOL = 0 (

mGy): DLP = 668.57 

(mGy-cm)                                             MERLY Michel



                                PROCEDURE INFORMATION:                 



                                Exam: CT Chest Without Contrast; Diagnostic     

            



                                Exam date and time: 5/10/2022 3:01 PM           

      



                                Age: 72 years old                 



                                Clinical indication: Solitary pulmonary nodule; 

Additional info: /r91.1                 



                                solitary pulmonary nodule; R05.9 cough, unspecif

ied                 



                                TECHNIQUE:                      



                                                    Imaging protocol: Diagnostic

 computed tomography of the chest without contrast.

                                                    



                                                    Radiation optimization: All 

CT scans at this facility use at least one of these

                                                    



                                dose optimization techniques: automated exposure

 control; mA and/or kV                 



                                adjustment per patient size (includes targeted e

xams where dose is matched to                 



                                clinical indication); or iterative reconstructio

n.                 



                                COMPARISON:                     



                                No relevant prior studies available.            

     



                                RADIATION DOSE METRICS:                 



                                Total DLP (mGy-cm): 668.57                 



                                FINDINGS:                       



                                Lungs: Minimal right middle lobe and lingular sc

arring.                 



                                Pleural spaces: Unremarkable. No pneumothorax. N

o pleural effusion.                 



                                Heart: Heart size normal. Mild bilateral coronar

y atherosclerotic calcific                 



                                disease is present.                 



                                Lymph nodes: Unremarkable. No enlarged lymph nod

es.                 



                                        Vasculature: Mild scattered calcified pl

aque is present in the thoracic aorta. 

                                        



                                        Liver: Hepatic multiple simple incidenta

l cysts noted, coronal further imaging 

                                        



                                follow-up.                      



                                Adrenal glands: Right adrenal 1.7 cm nodule anai

uring less than 10 [place]                 



                                units in attenuation. Left adrenal gland is norm

al.                 



                                Bones/joints: Midthoracic spine vertebral body b

one island. Minimal                 



                                levoscoliosis of the thoracolumbar spinal juncti

on region with mild                 



                                degenerative osteophytosis.                 



                                Soft tissues: Unremarkable.                 



                                IMPRESSION:                     



                                1. No pulmonary nodule.                 



                                2. Right adrenal adenoma, 1.7 cm.               

  



                                COMMENTS:                       



                                Consistent with the [employer]

y's Incidental Findings                 



                                Committee white paper (J Am Sepideh Radiol 2017): A

ny incidental adrenal lesion                 



                                less than or equal to 1 cm is likely benign. No 

follow-up imaging is                 



                                recommended for these lesions per consensus alejandro

mmendations based on imaging                 



                                criteria. Further lab evaluation could be pursue

d if warranted based on                 



                                clinical findings.                 



                                Shantell Waggoner MD On 2022 08:33:28;

 [id number]

## 2023-08-24 NOTE — EDPHYS
Physician Documentation                                                                           

 [facility]                                                                 

Name: Jesus Muller                                                                             

Age: 73 yrs                                                                                       

Sex: Male                                                                                         

: 1950                                                                                   

MRN: [id number]                                                                                   

Arrival Date: 2023                                                                          

Time: 10:54                                                                                       

Account#: [id number]                                                                            

Bed 5                                                                                             

Private MD:                                                                                       

ED Physician Oswaldo Desouza                                                                      

HPI:                                                                                              

                                                                                             

11:15 This 73 yrs old  Male presents to ER via Wheelchair with complaints of         josh 

      Breathing Difficulty.                                                                       

11:15 The patient has shortness of breath with light activity. Onset: The symptoms/episode    josh 

      began/occurred 3 month(s) ago. Duration: The symptoms are continuous, and are steadily      

      getting worse. The patient's shortness of breath has no apparent modifying factors.         

      Associated signs and symptoms: Pertinent positives: non-productive cough, dizziness.        

      Severity of symptoms: At their worst the symptoms were mild moderate in the emergency       

      department the symptoms have resolved. The patient has experienced similar episodes in      

      the past, multiple times.                                                                   

                                                                                                  

Historical:                                                                                       

- Allergies:                                                                                      

10:56 PENICILLINS;                                                                            bp  

- PMHx:                                                                                           

10:56 Hypertensive disorder;                                                                  bp  

                                                                                                  

- Immunization history:: Adult Immunizations up to date, Client reports receiving the             

  2nd dose of the Covid vaccine.                                                                  

- Social history:: Smoking status: Patient reports the use of cigarette tobacco                   

  products, Patient/guardian denies using tobacco, Stopped _ months ago 2.                        

                                                                                                  

                                                                                                  

ROS:                                                                                              

11:16 Constitutional: Negative for fever, chills, and weight loss, Eyes: Negative for injury, josh 

      pain, redness, and discharge, ENT: Negative for injury, pain, and discharge, Neck:          

      Negative for injury, pain, and swelling, Cardiovascular: Negative for chest pain,           

      palpitations, and edema, Abdomen/GI: Negative for abdominal pain, nausea, vomiting,         

      diarrhea, and constipation, Back: Negative for injury and pain, : Negative for            

      injury, bleeding, discharge, and swelling, MS/Extremity: Negative for injury and            

      deformity, Skin: Negative for injury, rash, and discoloration, Neuro: Negative for          

      headache, weakness, numbness, tingling, and seizure, Psych: Negative for depression,        

      anxiety, suicide ideation, homicidal ideation, and hallucinations, Allergy/Immunology:      

      Negative for hives, rash, and allergies, Endocrine: Negative for neck swelling,             

      polydipsia, polyuria, polyphagia, and marked weight changes, Hematologic/Lymphatic:         

      Negative for swollen nodes, abnormal bleeding, and unusual bruising.                        

11:16 Respiratory: Positive for cough, shortness of breath, at rest.                              

                                                                                                  

Exam:                                                                                             

11:16 Constitutional:  This is a well developed, well nourished patient who is awake, alert,  josh 

      and in no acute distress. Head/Face:  Normocephalic, atraumatic. Eyes:  Pupils equal        

      round and reactive to light, extra-ocular motions intact.  Lids and lashes normal.          

      Conjunctiva and sclera are non-icteric and not injected.  Cornea within normal limits.      

      Periorbital areas with no swelling, redness, or edema. ENT:  Nares patent. No nasal         

      discharge, no septal abnormalities noted.  Tympanic membranes are normal and external       

      auditory canals are clear.  Oropharynx with no redness, swelling, or masses, exudates,      

      or evidence of obstruction, uvula midline.  Mucous membranes moist. Neck:  Trachea          

      midline, no thyromegaly or masses palpated, and no cervical lymphadenopathy.  Supple,       

      full range of motion without nuchal rigidity, or vertebral point tenderness.  No            

      Meningismus. Chest/axilla:  Normal chest wall appearance and motion.  Nontender with no     

      deformity.  No lesions are appreciated. Cardiovascular:  Regular rate and rhythm with a     

      normal S1 and S2.  No gallops, murmurs, or rubs.  Normal PMI, no JVD.  No pulse             

      deficits. Respiratory:  Lungs have equal breath sounds bilaterally, clear to                

      auscultation and percussion.  No rales, rhonchi or wheezes noted.  No increased work of     

      breathing, no retractions or nasal flaring. Abdomen/GI:  Soft, non-tender, with normal      

      bowel sounds.  No distension or tympany.  No guarding or rebound.  No evidence of           

      tenderness throughout. Back:  No spinal tenderness.  No costovertebral tenderness.          

      Full range of motion. Male :  Normal genitalia with no discharge or lesions. Skin:        

      Warm, dry with normal turgor.  Normal color with no rashes, no lesions, and no evidence     

      of cellulitis. MS/ Extremity:  Pulses equal, no cyanosis.  Neurovascular intact.  Full,     

      normal range of motion. Neuro:  Awake and alert, GCS 15, oriented to person, place,         

      time, and situation.  Cranial nerves II-XII grossly intact.  Motor strength 5/5 in all      

      extremities.  Sensory grossly intact.  Cerebellar exam normal.  Normal gait. Psych:         

      Awake, alert, with orientation to person, place and time.  Behavior, mood, and affect       

      are within normal limits.                                                                   

12:33 ECG was reviewed by the Attending Physician.                                            [facility] 

                                                                                                  

Vital Signs:                                                                                      

11:00  / 81; Pulse 81; Resp 18; Temp 97.7; Pulse Ox 99% ; Pain 0/10;                    bp  

13:38  / 81; Pulse 94; Resp 16; Pulse Ox 98% on R/A;                                    iw  

11:00 Pain Scale: Adult                                                                       bp  

                                                                                                  

MDM:                                                                                              

10:56 Patient medically screened.                                                             [facility] 

11:17 Differential diagnosis: Anemia Anxiety Reaction asthma, Bronchitis CHF exacerbation,    josh 

      Chronic Obstructive Pulmonary Disease Myocardial Infarction pneumonia, reactive airway      

      disease. Antibiotic administration: The patient is discharged and will get outpatient       

      antibiotics, Zithromax. Differential Diagnosis: Obstructed Airway Bronchitis Influenza      

      Upper Respiratory Infection Pharyngitis Asthma Exacerbation Viral Syndrome Pneumonia.       

      Immunization status: Pneumococcal vaccine: within last 5 years. Influenza vaccine:          

      within last 5 years. Data reviewed: vital signs, nurses notes, lab test result(s), EKG,     

      radiologic studies, CT scan, plain films. Consideration of Admission/Observation            

      Escalation of care including admission/observation considered. I considered the             

      following discharge prescriptions or medication management in the emergency department      

      Medications were administered in the Emergency Department. See MAR. Test considered but     

      Not performed: Ultrasound NO ECHO , NO MRI. Care significantly affected by the              

      following chronic conditions: Hypertension, Chronic Obstructive Pulmonary Disease.          

      Counseling: I had a detailed discussion with the patient and/or guardian regarding the      

      historical points, exam findings, and any diagnostic results supporting the                 

      discharge/admit diagnosis, lab results, radiology results.                                  

                                                                                                  

                                                                                             

11:14 Order name: Basic Metabolic Panel; Complete Time: 12:                                 [facility] 

                                                                                             

11:14 Order name: CBC with Diff; Complete Time: 12:                                         [facility] 

                                                                                             

11:14 Order name: LFT's; Complete Time: 12:04                                                 [facility] 

                                                                                             

11:14 Order name: Magnesium; Complete Time: 12:                                             [facility] 

                                                                                             

11:14 Order name: NT PRO-BNP; Complete Time: 12:                                            [facility] 

                                                                                             

11:14 Order name: PT-INR; Complete Time: 12:                                                [facility] 

                                                                                             

11:14 Order name: Troponin HS; Complete Time: 12:                                           [facility] 

                                                                                             

11:15 Order name: SARS RAPID; Complete Time: 12:                                            [facility] 

                                                                                             

11:15 Order name: Flu; Complete Time: 12:                                                   [facility] 

                                                                                             

11:14 Order name: XRAY Chest (1 view); Complete Time: 12:30                                   [facility] 

                                                                                             

11:14 Order name: CT Chest For PE Angio; Complete Time: 13:37                                 [facility] 

                                                                                             

12:30 Order name: Echo w/ Doppler                                                             [facility] 

                                                                                             

11:14 Order name: EKG; Complete Time: 11:15                                                   [facility] 

                                                                                             

11:14 Order name: EKG - Nurse/Tech; Complete Time: 12:17                                      [facility] 

                                                                                             

11:14 Order name: IV Saline Lock; Complete Time: 11:30                                        [facility] 

                                                                                             

11:14 Order name: Labs collected and sent; Complete Time: 11:30                               [facility] 

                                                                                             

11:14 Order name: O2 Per Protocol; Complete Time: :30                                       [facility] 

                                                                                             

11:14 Order name: O2 Sat Monitoring; Complete Time: 11:30                                     [facility] 

                                                                                                  

EC:33 Rate is 75 beats/min. Rhythm is regular. QRS Axis is Normal. IA interval is normal. QRS josh 

      interval is normal. QT interval is normal. No Q waves. T waves are Normal. No ST            

      changes noted. Clinical impression: NSR w/ Non-specific ST/T Changes and No evidence of     

      ischemia. Interpreted by me. Reviewed by me.                                                

                                                                                                  

Administered Medications:                                                                         

11:52 Drug: NS 0.9% IV 1000 ml Route: IV; Rate: 1 bolus; Site: left antecubital;              iw  

13:00 Follow up: IV Status: Completed infusion                                                iw  

11:52 Drug: Famotidine IVP 20 mg Route: IVP; Site: left antecubital;                          iw  

12:30 Follow up: Response: No adverse reaction                                                iw  

11:52 Drug: MethylPrednisoLONE  mg Route: IVP; Site: left antecubital;                 iw  

12:30 Follow up: Response: No adverse reaction                                                iw  

11:52 Drug: predniSONE PO 60 mg Route: PO;                                                    iw  

12:15 Follow up: Response: No adverse reaction                                                iw  

11:52 Drug: Levalbuterol Inhalation 3.75 mg Route: Inhalation;                                iw  

11:52 Drug: Ipratropium Inhalation Aerosol 0.5 mg Route: Inhalation;                          iw  

                                                                                                  

                                                                                                  

Disposition Summary:                                                                              

23 13:57                                                                                    

Discharge Ordered                                                                                 

      Location: Home                                                                          josh 

      Problem: new                                                                            josh 

      Symptoms: have improved                                                                 josh 

      Condition: Stable                                                                       josh 

      Diagnosis                                                                                   

        - Dyspnea                                                                             josh 

        - COPD/ Chronic obstructive pulmonary disease, unspecified                            josh 

        - Essential (primary) hypertension                                                    josh 

      Followup:                                                                               josh 

        - With: Private Physician                                                                  

        - When: 2 - 3 days                                                                         

        - Reason: Recheck today's complaints, Continuance of care, Re-evaluation by your           

      physician                                                                                   

      Followup:                                                                               josh 

        - With:                                                                                    

        - When: 2 - 3 days                                                                         

        - Reason: Recheck today's complaints, Re-evaluation by your physician                      

      Discharge Instructions:                                                                     

        - Discharge Summary Sheet                                                             josh 

        - Chronic Bronchitis, Adult                                                           josh 

        - Chronic Obstructive Pulmonary Disease                                               josh 

        - Heart Failure, Diagnosis                                                            josh 

        - Steps to Quit Smoking                                                               josh 

        - Health Risks of Smoking                                                             josh 

        - Chronic Obstructive Pulmonary Disease, Easy-to-Read                                 josh 

        - Steps to Quit Smoking, Easy-to-Read                                                 josh 

        - Cough, Adult, Easy-to-Read                                                          josh 

        - Managing the Challenge of Quitting Smoking                                          josh 

        - Preventing Heart Failure                                                            josh 

        - Heart Failure Eating Plan                                                           [facility] 

      Forms:                                                                                      

        - Medication Reconciliation Form                                                      [facility] 

        - Thank You Letter                                                                    [facility] 

        - Antibiotic Education                                                                josh 

        - Prescription Opioid Use                                                             josh 

        - Patient Portal Instructions                                                         [facility] 

        - Leadership Thank You Letter                                                         [facility] 

      Prescriptions:                                                                              

        - albuterol sulfate 90 mcg/actuation Inhalation HFA Aerosol Inhaler                        

            - inhale 2 inhalation by INHALATION route every 4-6 hours as needed for           josh 

      bronchospasm; administer via ventilator; 1 unit; Refills: 0, Product Selection Permitted    

        - Albuterol Sulfate 2.5 mg /3 mL (0.083 %) Inhalation Solution for Nebulization            

            - inhale 1 unit by NEBULIZATION route every 6-8 hours As needed; 60 unit;         [facility] 

      Refills: 0, Product Selection Permitted                                                     

        - Prednisone 20 mg Oral Tablet                                                             

            - take 2 tablets by ORAL route once daily for 5 days; 10 tablet; Refills: 0,      [facility] 

      Product Selection Permitted                                                                 

        - Guaifenesin AC  mg/5 mL Oral Liquid                                                

            - take 10 milliliters by ORAL route every 6 hours As needed; 180 milliliter;      [facility] 

      Refills: 0, Product Selection Permitted                                                     

        - Zithromax 500 mg Oral Tablet                                                             

            - take 1 tablet by ORAL route once daily for 5 days; 5 tablet; Refills: 0,        [facility] 

      Product Selection Permitted                                                                 

Signatures:                                                                                       

Dispatcher [employer]                           Oswaldo Huff MD MD cha Williams, Irene, RN RN iw Peltier, Brian, RN                      RN   bp                                                   

                                                                                                  

Corrections: (The following items were deleted from the chart)                                    

10:57 10:56 Social history: Smoking status: Patient denies any tobacco usage or history of. bpbp  

                                                                                                  

**************************************************************************************************

## 2023-08-25 NOTE — ECHO
HEIGHT:  ft  in   WEIGHT:  lb  oz   DATE OF STUDY: 08/24/2023   REFER DR: Oswaldo Desouza MD

2-DIMENSIONAL: YES

     M.MODE: YES

 DOPPLER: YES

COLOR FLOW: YES



                    TDS:  YES

PORTABLE: YES

 DEFINITY:  

BUBBLE STUDY: 





DIAGNOSIS:  CONGESTIVE HEART FAILURE



CARDIAC HISTORY:  

CATHERIZATION: NO

SURGERY: NO

PROSTHETIC VALVE: NO

PACEMAKER: NO





MEASUREMENTS (cm)

    DIASTOLIC (NORMALS)                 SYSTOLIC (NORMALS)

IVSd                 1.0 (0.6-1.2)                    LA Diam  (1.9-4.0)     LVEF         
71%  

LVIDd               4.0 (3.5-5.7)                        LVIDs      2.4 (2.0-3.5)     %FS  
        40%

LVPWd             1.1 (0.6-1.2)

Ao Diam           3.4 (2.0-3.7)



2 DIMENSIONAL ASSESSMENT:

RIGHT ATRIUM:                   NORMAL

LEFT ATRIUM:       NORMAL



RIGHT VENTRICLE:            NORMAL

LEFT VENTRICLE: NORMAL



TRICUSPID VALVE:  MILD TRICUSPID REGURGITATION

MITRAL VALVE:     NORMAL



PULMONIC VALVE:             NORMAL

AORTIC VALVE:     MILD AORTIC INSUFFICIENCY



PERICARDIAL EFFUSION: NONE

AORTIC ROOT:      NORMAL





LEFT VENTRICULAR WALL MOTION:     NORMAL



DOPPLER/COLOR FLOW:     SEE BELOW



COMMENTS:      

  1. NORMAL LEFT VENTRICULAR EJECTION FRACTION 60-65% WITH NORMAL WALL MOTION

  2. MILD TRICUSPID REGURGITATION

  3. MILD AORTIC INSUFFICIENCY



TECHNOLOGIST:   JAMES SHERIFF